# Patient Record
Sex: MALE | Race: AMERICAN INDIAN OR ALASKA NATIVE | NOT HISPANIC OR LATINO | Employment: OTHER | URBAN - METROPOLITAN AREA
[De-identification: names, ages, dates, MRNs, and addresses within clinical notes are randomized per-mention and may not be internally consistent; named-entity substitution may affect disease eponyms.]

---

## 2023-03-03 PROBLEM — E11.9 TYPE 2 DIABETES MELLITUS (MULTI): Status: ACTIVE | Noted: 2019-10-09

## 2023-03-03 PROBLEM — J44.9 MODERATE CHRONIC OBSTRUCTIVE PULMONARY DISEASE (MULTI): Status: ACTIVE | Noted: 2019-03-13

## 2023-03-03 RX ORDER — ASPIRIN 81 MG/1
TABLET ORAL
COMMUNITY

## 2023-03-03 RX ORDER — LEVOTHYROXINE SODIUM 112 UG/1
112 TABLET ORAL DAILY
COMMUNITY
Start: 2021-09-17 | End: 2023-03-29 | Stop reason: SDUPTHER

## 2023-03-03 RX ORDER — ATORVASTATIN CALCIUM 40 MG/1
40 TABLET, FILM COATED ORAL NIGHTLY
COMMUNITY
Start: 2014-01-17 | End: 2023-03-29 | Stop reason: SDUPTHER

## 2023-03-03 RX ORDER — METOPROLOL TARTRATE 50 MG/1
50 TABLET ORAL 2 TIMES DAILY
COMMUNITY
Start: 2012-10-05 | End: 2023-03-29 | Stop reason: SDUPTHER

## 2023-03-21 LAB
ALANINE AMINOTRANSFERASE (SGPT) (U/L) IN SER/PLAS: 15 U/L (ref 10–52)
ALBUMIN (G/DL) IN SER/PLAS: 3.9 G/DL (ref 3.4–5)
ALKALINE PHOSPHATASE (U/L) IN SER/PLAS: 95 U/L (ref 33–136)
ANION GAP IN SER/PLAS: 11 MMOL/L (ref 10–20)
ASPARTATE AMINOTRANSFERASE (SGOT) (U/L) IN SER/PLAS: 14 U/L (ref 9–39)
BILIRUBIN TOTAL (MG/DL) IN SER/PLAS: 0.7 MG/DL (ref 0–1.2)
CALCIUM (MG/DL) IN SER/PLAS: 8.9 MG/DL (ref 8.6–10.3)
CARBON DIOXIDE, TOTAL (MMOL/L) IN SER/PLAS: 29 MMOL/L (ref 21–32)
CHLORIDE (MMOL/L) IN SER/PLAS: 100 MMOL/L (ref 98–107)
CREATININE (MG/DL) IN SER/PLAS: 0.93 MG/DL (ref 0.5–1.3)
ESTIMATED AVERAGE GLUCOSE FOR HBA1C: 140 MG/DL
GFR MALE: 88 ML/MIN/1.73M2
GLUCOSE (MG/DL) IN SER/PLAS: 92 MG/DL (ref 74–99)
HEMOGLOBIN A1C/HEMOGLOBIN TOTAL IN BLOOD: 6.5 %
POTASSIUM (MMOL/L) IN SER/PLAS: 3.9 MMOL/L (ref 3.5–5.3)
PROSTATE SPECIFIC AG (NG/ML) IN SER/PLAS: 0.47 NG/ML (ref 0–4)
PROTEIN TOTAL: 6.9 G/DL (ref 6.4–8.2)
SODIUM (MMOL/L) IN SER/PLAS: 136 MMOL/L (ref 136–145)
THYROTROPIN (MIU/L) IN SER/PLAS BY DETECTION LIMIT <= 0.05 MIU/L: 4.55 MIU/L (ref 0.44–3.98)
THYROXINE (T4) FREE (NG/DL) IN SER/PLAS: 1.09 NG/DL (ref 0.61–1.12)
UREA NITROGEN (MG/DL) IN SER/PLAS: 14 MG/DL (ref 6–23)

## 2023-03-29 ENCOUNTER — OFFICE VISIT (OUTPATIENT)
Dept: PRIMARY CARE | Facility: CLINIC | Age: 73
End: 2023-03-29
Payer: MEDICARE

## 2023-03-29 VITALS
SYSTOLIC BLOOD PRESSURE: 120 MMHG | OXYGEN SATURATION: 98 % | HEIGHT: 70 IN | WEIGHT: 217 LBS | BODY MASS INDEX: 31.07 KG/M2 | HEART RATE: 50 BPM | DIASTOLIC BLOOD PRESSURE: 74 MMHG | TEMPERATURE: 97 F

## 2023-03-29 DIAGNOSIS — Z00.00 ROUTINE GENERAL MEDICAL EXAMINATION AT HEALTH CARE FACILITY: Primary | ICD-10-CM

## 2023-03-29 DIAGNOSIS — J44.9 MODERATE CHRONIC OBSTRUCTIVE PULMONARY DISEASE (MULTI): ICD-10-CM

## 2023-03-29 DIAGNOSIS — E78.2 MIXED HYPERLIPIDEMIA: ICD-10-CM

## 2023-03-29 DIAGNOSIS — E11.9 TYPE 2 DIABETES MELLITUS WITHOUT COMPLICATION, WITHOUT LONG-TERM CURRENT USE OF INSULIN (MULTI): ICD-10-CM

## 2023-03-29 DIAGNOSIS — E03.9 HYPOTHYROIDISM, UNSPECIFIED TYPE: ICD-10-CM

## 2023-03-29 DIAGNOSIS — I25.10 CORONARY ARTERIOSCLEROSIS: ICD-10-CM

## 2023-03-29 DIAGNOSIS — I10 ESSENTIAL HYPERTENSION: ICD-10-CM

## 2023-03-29 PROCEDURE — 3078F DIAST BP <80 MM HG: CPT | Performed by: FAMILY MEDICINE

## 2023-03-29 PROCEDURE — G0439 PPPS, SUBSEQ VISIT: HCPCS | Performed by: FAMILY MEDICINE

## 2023-03-29 PROCEDURE — 1170F FXNL STATUS ASSESSED: CPT | Performed by: FAMILY MEDICINE

## 2023-03-29 PROCEDURE — 1036F TOBACCO NON-USER: CPT | Performed by: FAMILY MEDICINE

## 2023-03-29 PROCEDURE — 3044F HG A1C LEVEL LT 7.0%: CPT | Performed by: FAMILY MEDICINE

## 2023-03-29 PROCEDURE — 3074F SYST BP LT 130 MM HG: CPT | Performed by: FAMILY MEDICINE

## 2023-03-29 PROCEDURE — 99214 OFFICE O/P EST MOD 30 MIN: CPT | Performed by: FAMILY MEDICINE

## 2023-03-29 PROCEDURE — 1159F MED LIST DOCD IN RCRD: CPT | Performed by: FAMILY MEDICINE

## 2023-03-29 PROCEDURE — 1160F RVW MEDS BY RX/DR IN RCRD: CPT | Performed by: FAMILY MEDICINE

## 2023-03-29 RX ORDER — LEVOTHYROXINE SODIUM 112 UG/1
112 TABLET ORAL DAILY
Qty: 90 TABLET | Refills: 3 | Status: SHIPPED | OUTPATIENT
Start: 2023-03-29 | End: 2024-04-03 | Stop reason: SDUPTHER

## 2023-03-29 RX ORDER — ATORVASTATIN CALCIUM 40 MG/1
40 TABLET, FILM COATED ORAL NIGHTLY
Qty: 90 TABLET | Refills: 3 | Status: SHIPPED | OUTPATIENT
Start: 2023-03-29 | End: 2023-10-04 | Stop reason: SDUPTHER

## 2023-03-29 RX ORDER — METOPROLOL TARTRATE 50 MG/1
50 TABLET ORAL 2 TIMES DAILY
Qty: 180 TABLET | Refills: 3 | Status: SHIPPED | OUTPATIENT
Start: 2023-03-29 | End: 2023-10-04 | Stop reason: SDUPTHER

## 2023-03-29 ASSESSMENT — PATIENT HEALTH QUESTIONNAIRE - PHQ9
1. LITTLE INTEREST OR PLEASURE IN DOING THINGS: NOT AT ALL
2. FEELING DOWN, DEPRESSED OR HOPELESS: NOT AT ALL
SUM OF ALL RESPONSES TO PHQ9 QUESTIONS 1 AND 2: 0

## 2023-03-29 ASSESSMENT — ACTIVITIES OF DAILY LIVING (ADL)
GROCERY_SHOPPING: INDEPENDENT
DOING_HOUSEWORK: INDEPENDENT
TAKING_MEDICATION: INDEPENDENT
MANAGING_FINANCES: INDEPENDENT
DRESSING: INDEPENDENT
BATHING: INDEPENDENT

## 2023-03-29 NOTE — PROGRESS NOTES
"Subjective   Reason for Visit: Heri Beasley is an 72 y.o. male here for a Medicare Wellness visit.     Past Medical, Surgical, and Family History reviewed and updated in chart.    Reviewed all medications by prescribing practitioner or clinical pharmacist (such as prescriptions, OTCs, herbal therapies and supplements) and documented in the medical record.    HPI: Pt lost his wife this winter to metastatic breast cancer    1. COPD: stable    2. DM2: a1c was 6.5.  Hasn't been as active.    3. hypothyroid: stable    Preparing meals - independent  Using telephone - independent  Bladder - continent  Bowel - continent    Recent hospital stays -     ADLs - able to dress, walk and bath independently  Instrumental ADL's - able to shop, maintain finances, managing medications, housekeeping independently    Depression: PHQ-2 (screening 2 mins) - negative    Opioid use -   none  Exercise -  tries to stay active  Diet - well balanced  Pain score -    Education - educated pt on healthy eating, exercise, weight loss    Falls -no falls      MiniCO/5      Counseled pt on living will - pt has one    AAA screening: negative in      Prostate CA screen: declines    CV screening: checked and good    Colonoscopy: due for     Diabetes screening:  checked this time, a1c was 6.4.    Glaucoma screen:  encouraged to see optho    Vaccines:  Had the J&J vaccine.  didn't get flu or PNA shot.  Had PNA shot once before.    Patient Care Team:  Fred Ramon MD as PCP - General  Fred Ramon MD as PCP - [employer] PCP             Review of Systems   All other systems reviewed and are negative.      Objective   Vitals:  /74   Pulse 50   Temp 36.1 °C (97 °F)   Ht 1.778 m (5' 10\")   Wt 98.4 kg (217 lb)   SpO2 98%   BMI 31.14 kg/m²       Physical Exam  Vitals reviewed.   Constitutional:       General: He is not in acute distress.     Appearance: Normal appearance. He is well-developed. He is not " diaphoretic.   HENT:      Head: Normocephalic and atraumatic.      Right Ear: Tympanic membrane normal.      Left Ear: Tympanic membrane normal.      Nose: Nose normal.      Mouth/Throat:      Mouth: Mucous membranes are moist.   Eyes:      Pupils: Pupils are equal, round, and reactive to light.   Cardiovascular:      Rate and Rhythm: Normal rate and regular rhythm.      Heart sounds: Normal heart sounds. No murmur heard.     No friction rub. No gallop.   Pulmonary:      Effort: Pulmonary effort is normal.      Breath sounds: Normal breath sounds. No rales.   Abdominal:      General: Bowel sounds are normal.      Palpations: Abdomen is soft.      Tenderness: There is no abdominal tenderness.   Musculoskeletal:      Cervical back: Normal range of motion and neck supple.   Skin:     General: Skin is warm and dry.   Neurological:      Mental Status: He is alert.   Psychiatric:         Mood and Affect: Mood normal.         Assessment/Plan   Problem List Items Addressed This Visit          Respiratory    Moderate chronic obstructive pulmonary disease (CMS/HCC)    Overview     Note: JW            Circulatory    Coronary arteriosclerosis    Overview     Note: fe         Essential hypertension    Overview     Note: bw            Endocrine/Metabolic    Hypothyroidism    Overview     Note: aa         Type 2 diabetes mellitus (CMS/HCC)       Other    Mixed hyperlipidemia    Overview     Note: fe          Other Visit Diagnoses       Routine general medical examination at health care facility    -  Primary          Comforted pt after his wife's passing.  Refilled pts meds.  Fu in 6 mo.

## 2023-09-27 ENCOUNTER — LAB (OUTPATIENT)
Dept: LAB | Facility: LAB | Age: 73
End: 2023-09-27
Payer: MEDICARE

## 2023-09-27 DIAGNOSIS — E11.9 TYPE 2 DIABETES MELLITUS WITHOUT COMPLICATION, WITHOUT LONG-TERM CURRENT USE OF INSULIN (MULTI): ICD-10-CM

## 2023-09-27 LAB
ALANINE AMINOTRANSFERASE (SGPT) (U/L) IN SER/PLAS: 19 U/L (ref 10–52)
ALBUMIN (G/DL) IN SER/PLAS: 4 G/DL (ref 3.4–5)
ALKALINE PHOSPHATASE (U/L) IN SER/PLAS: 79 U/L (ref 33–136)
ANION GAP IN SER/PLAS: 10 MMOL/L (ref 10–20)
ASPARTATE AMINOTRANSFERASE (SGOT) (U/L) IN SER/PLAS: 15 U/L (ref 9–39)
BILIRUBIN TOTAL (MG/DL) IN SER/PLAS: 1 MG/DL (ref 0–1.2)
CALCIUM (MG/DL) IN SER/PLAS: 8.9 MG/DL (ref 8.6–10.3)
CARBON DIOXIDE, TOTAL (MMOL/L) IN SER/PLAS: 28 MMOL/L (ref 21–32)
CHLORIDE (MMOL/L) IN SER/PLAS: 103 MMOL/L (ref 98–107)
CHOLESTEROL (MG/DL) IN SER/PLAS: 98 MG/DL (ref 0–199)
CHOLESTEROL IN HDL (MG/DL) IN SER/PLAS: 36.8 MG/DL
CHOLESTEROL/HDL RATIO: 2.7
CREATININE (MG/DL) IN SER/PLAS: 0.89 MG/DL (ref 0.5–1.3)
ESTIMATED AVERAGE GLUCOSE FOR HBA1C: 151 MG/DL
GFR MALE: >90 ML/MIN/1.73M2
GLUCOSE (MG/DL) IN SER/PLAS: 90 MG/DL (ref 74–99)
HEMOGLOBIN A1C/HEMOGLOBIN TOTAL IN BLOOD: 6.9 %
LDL: 48 MG/DL (ref 0–99)
POTASSIUM (MMOL/L) IN SER/PLAS: 4.3 MMOL/L (ref 3.5–5.3)
PROTEIN TOTAL: 6.4 G/DL (ref 6.4–8.2)
SODIUM (MMOL/L) IN SER/PLAS: 137 MMOL/L (ref 136–145)
TRIGLYCERIDE (MG/DL) IN SER/PLAS: 67 MG/DL (ref 0–149)
UREA NITROGEN (MG/DL) IN SER/PLAS: 17 MG/DL (ref 6–23)
VLDL: 13 MG/DL (ref 0–40)

## 2023-09-27 PROCEDURE — 80053 COMPREHEN METABOLIC PANEL: CPT

## 2023-09-27 PROCEDURE — 83036 HEMOGLOBIN GLYCOSYLATED A1C: CPT

## 2023-09-27 PROCEDURE — 80061 LIPID PANEL: CPT

## 2023-09-27 PROCEDURE — 36415 COLL VENOUS BLD VENIPUNCTURE: CPT

## 2023-10-04 ENCOUNTER — OFFICE VISIT (OUTPATIENT)
Dept: PRIMARY CARE | Facility: CLINIC | Age: 73
End: 2023-10-04
Payer: MEDICARE

## 2023-10-04 VITALS
BODY MASS INDEX: 31.28 KG/M2 | DIASTOLIC BLOOD PRESSURE: 72 MMHG | HEART RATE: 54 BPM | SYSTOLIC BLOOD PRESSURE: 116 MMHG | WEIGHT: 218 LBS | TEMPERATURE: 97.1 F

## 2023-10-04 DIAGNOSIS — E11.9 TYPE 2 DIABETES MELLITUS WITHOUT COMPLICATION, WITHOUT LONG-TERM CURRENT USE OF INSULIN (MULTI): ICD-10-CM

## 2023-10-04 DIAGNOSIS — Z12.5 SCREENING FOR PROSTATE CANCER: Primary | ICD-10-CM

## 2023-10-04 DIAGNOSIS — I10 ESSENTIAL HYPERTENSION: ICD-10-CM

## 2023-10-04 DIAGNOSIS — I25.10 CORONARY ARTERIOSCLEROSIS: ICD-10-CM

## 2023-10-04 PROCEDURE — 1159F MED LIST DOCD IN RCRD: CPT | Performed by: FAMILY MEDICINE

## 2023-10-04 PROCEDURE — 3074F SYST BP LT 130 MM HG: CPT | Performed by: FAMILY MEDICINE

## 2023-10-04 PROCEDURE — 3044F HG A1C LEVEL LT 7.0%: CPT | Performed by: FAMILY MEDICINE

## 2023-10-04 PROCEDURE — 3078F DIAST BP <80 MM HG: CPT | Performed by: FAMILY MEDICINE

## 2023-10-04 PROCEDURE — 1036F TOBACCO NON-USER: CPT | Performed by: FAMILY MEDICINE

## 2023-10-04 PROCEDURE — 1160F RVW MEDS BY RX/DR IN RCRD: CPT | Performed by: FAMILY MEDICINE

## 2023-10-04 PROCEDURE — 99214 OFFICE O/P EST MOD 30 MIN: CPT | Performed by: FAMILY MEDICINE

## 2023-10-04 RX ORDER — ATORVASTATIN CALCIUM 40 MG/1
40 TABLET, FILM COATED ORAL NIGHTLY
Qty: 90 EACH | Refills: 3 | Status: SHIPPED | OUTPATIENT
Start: 2023-10-04 | End: 2024-10-03

## 2023-10-04 RX ORDER — METOPROLOL TARTRATE 50 MG/1
50 TABLET ORAL 2 TIMES DAILY
Qty: 180 EACH | Refills: 3 | Status: SHIPPED | OUTPATIENT
Start: 2023-10-04 | End: 2024-10-03

## 2023-10-04 ASSESSMENT — ENCOUNTER SYMPTOMS: HYPERTENSION: 1

## 2023-10-04 NOTE — PROGRESS NOTES
Subjective   Patient ID: Heri Beasley is a 72 y.o. male who presents for Diabetes, Hypertension, Hyperlipidemia, and Hypothyroidism (Review BW).  Diabetes    Hypertension    Hyperlipidemia      1. COPD: stable    2. DM2: a1c was 6.9.  Hasn't been as active.    3. hypothyroid: stable    Patient Active Problem List   Diagnosis    Coronary arteriosclerosis    Essential hypertension    Mixed hyperlipidemia    Hypothyroidism    Moderate chronic obstructive pulmonary disease (CMS/HCC)    Type 2 diabetes mellitus (CMS/HCC)       Social Connections: Not on file       Current Outpatient Medications on File Prior to Visit   Medication Sig Dispense Refill    aspirin 81 mg EC tablet       atorvastatin (Lipitor) 40 mg tablet Take 1 tablet (40 mg) by mouth once daily at bedtime. 90 tablet 3    levothyroxine (Synthroid, Levoxyl) 112 mcg tablet Take 1 tablet (112 mcg) by mouth once daily. 90 tablet 3    metoprolol tartrate (Lopressor) 50 mg tablet Take 1 tablet (50 mg) by mouth in the morning and 1 tablet (50 mg) before bedtime. 180 tablet 3     No current facility-administered medications on file prior to visit.        Vitals:    10/04/23 0913   BP: 116/72   Pulse: 54   Temp: 36.2 °C (97.1 °F)     Vitals:    10/04/23 0913   Weight: 98.9 kg (218 lb)       Review of Systems   All other systems reviewed and are negative.      Objective     Physical Exam  Constitutional:       Appearance: Normal appearance. He is well-developed.   HENT:      Head: Atraumatic.   Cardiovascular:      Rate and Rhythm: Normal rate and regular rhythm.      Heart sounds: Normal heart sounds. No murmur heard.  Pulmonary:      Effort: Pulmonary effort is normal.      Breath sounds: Normal breath sounds.   Abdominal:      General: Bowel sounds are normal.      Palpations: Abdomen is soft.   Skin:     General: Skin is warm.   Neurological:      General: No focal deficit present.      Mental Status: He is alert.   Psychiatric:         Mood and Affect:  Mood normal.         Lab on 09/27/2023   Component Date Value Ref Range Status    Glucose 09/27/2023 90  74 - 99 mg/dL Final    Sodium 09/27/2023 137  136 - 145 mmol/L Final    Potassium 09/27/2023 4.3  3.5 - 5.3 mmol/L Final    Chloride 09/27/2023 103  98 - 107 mmol/L Final    Bicarbonate 09/27/2023 28  21 - 32 mmol/L Final    Anion Gap 09/27/2023 10  10 - 20 mmol/L Final    Urea Nitrogen 09/27/2023 17  6 - 23 mg/dL Final    Creatinine 09/27/2023 0.89  0.50 - 1.30 mg/dL Final    GFR MALE 09/27/2023 >90  >90 mL/min/1.73m2 Final     CALCULATIONS OF ESTIMATED GFR ARE PERFORMED   USING THE 2021 CKD-EPI STUDY REFIT EQUATION   WITHOUT THE RACE VARIABLE FOR THE IDMS-TRACEABLE   CREATININE METHODS.    https://jasn.asnjournals.org/content/early/2021/09/22/ASN.5415966363    Calcium 09/27/2023 8.9  8.6 - 10.3 mg/dL Final    Albumin 09/27/2023 4.0  3.4 - 5.0 g/dL Final    Alkaline Phosphatase 09/27/2023 79  33 - 136 U/L Final    Total Protein 09/27/2023 6.4  6.4 - 8.2 g/dL Final    AST 09/27/2023 15  9 - 39 U/L Final    Total Bilirubin 09/27/2023 1.0  0.0 - 1.2 mg/dL Final    ALT (SGPT) 09/27/2023 19  10 - 52 U/L Final     Patients treated with Sulfasalazine may generate    falsely decreased results for ALT.    Cholesterol 09/27/2023 98  0 - 199 mg/dL Final    .      AGE      DESIRABLE   BORDERLINE HIGH   HIGH     0-19 Y     0 - 169       170 - 199     >/= 200    20-24 Y     0 - 189       190 - 224     >/= 225         >24 Y     0 - 199       200 - 239     >/= 240   **All ranges are based on fasting samples. Specific   therapeutic targets will vary based on patient-specific   cardiac risk.  .   Pediatric guidelines reference:Pediatrics 2011, 128(S5).   Adult guidelines reference: NCEP ATPIII Guidelines,     JANNIE 2001, 258:2486-97  .   Venipuncture immediately after or during the    administration of Metamizole may lead to falsely   low results. Testing should be performed immediately   prior to Metamizole dosing.    HDL  09/27/2023 36.8 (A)  mg/dL Final    .      AGE      VERY LOW   LOW     NORMAL    HIGH       0-19 Y       < 35   < 40     40-45     ----    20-24 Y       ----   < 40       >45     ----      >24 Y       ----   < 40     40-60      >60  .    Cholesterol/HDL Ratio 09/27/2023 2.7   Final    REF VALUES  DESIRABLE  < 3.4  HIGH RISK  > 5.0    LDL 09/27/2023 48  0 - 99 mg/dL Final    .                           NEAR      BORD      AGE      DESIRABLE  OPTIMAL    HIGH     HIGH     VERY HIGH     0-19 Y     0 - 109     ---    110-129   >/= 130     ----    20-24 Y     0 - 119     ---    120-159   >/= 160     ----      >24 Y     0 -  99   100-129  130-159   160-189     >/=190  .    VLDL 09/27/2023 13  0 - 40 mg/dL Final    Triglycerides 09/27/2023 67  0 - 149 mg/dL Final    .      AGE      DESIRABLE   BORDERLINE HIGH   HIGH     VERY HIGH   0 D-90 D    19 - 174         ----         ----        ----  91 D- 9 Y     0 -  74        75 -  99     >/= 100      ----    10-19 Y     0 -  89        90 - 129     >/= 130      ----    20-24 Y     0 - 114       115 - 149     >/= 150      ----         >24 Y     0 - 149       150 - 199    200- 499    >/= 500  .   Venipuncture immediately after or during the    administration of Metamizole may lead to falsely   low results. Testing should be performed immediately   prior to Metamizole dosing.    Hemoglobin A1C 09/27/2023 6.9 (A)  % Final         Diagnosis of Diabetes-Adults   Non-Diabetic: < or = 5.6%   Increased risk for developing diabetes: 5.7-6.4%   Diagnostic of diabetes: > or = 6.5%  .       Monitoring of Diabetes                Age (y)     Therapeutic Goal (%)   Adults:          >18           <7.0   Pediatrics:    13-18           <7.5                   7-12           <8.0                   0- 6            7.5-8.5   American Diabetes Association. Diabetes Care 33(S1), Jan 2010.    Estimated Average Glucose 09/27/2023 151  MG/DL Final       Assessment/Plan   Problem List Items Addressed This  Visit             ICD-10-CM    Coronary arteriosclerosis I25.10    Relevant Medications    atorvastatin (Lipitor) 40 mg tablet    metoprolol tartrate (Lopressor) 50 mg tablet    Other Relevant Orders    Comprehensive Metabolic Panel    TSH with reflex to Free T4 if abnormal    Lipid Panel    Essential hypertension I10    Relevant Medications    metoprolol tartrate (Lopressor) 50 mg tablet    Type 2 diabetes mellitus (CMS/HCC) E11.9    Relevant Orders    Hemoglobin A1C    Comprehensive Metabolic Panel    TSH with reflex to Free T4 if abnormal    Lipid Panel     Other Visit Diagnoses         Codes    Screening for prostate cancer    -  Primary Z12.5    Relevant Orders    Prostate Specific Antigen          Patient is doing ok.  Advised weight loss.  Refilled pts meds.  Follow up in 6 mo.

## 2024-03-25 ENCOUNTER — LAB (OUTPATIENT)
Dept: LAB | Facility: LAB | Age: 74
End: 2024-03-25
Payer: MEDICARE

## 2024-03-25 DIAGNOSIS — I25.10 CORONARY ARTERIOSCLEROSIS: ICD-10-CM

## 2024-03-25 DIAGNOSIS — Z12.5 SCREENING FOR PROSTATE CANCER: ICD-10-CM

## 2024-03-25 DIAGNOSIS — E11.9 TYPE 2 DIABETES MELLITUS WITHOUT COMPLICATION, WITHOUT LONG-TERM CURRENT USE OF INSULIN (MULTI): ICD-10-CM

## 2024-03-25 LAB
ALBUMIN SERPL BCP-MCNC: 3.9 G/DL (ref 3.4–5)
ALP SERPL-CCNC: 82 U/L (ref 33–136)
ALT SERPL W P-5'-P-CCNC: 16 U/L (ref 10–52)
ANION GAP SERPL CALC-SCNC: 8 MMOL/L (ref 10–20)
AST SERPL W P-5'-P-CCNC: 12 U/L (ref 9–39)
BILIRUB SERPL-MCNC: 0.7 MG/DL (ref 0–1.2)
BUN SERPL-MCNC: 15 MG/DL (ref 6–23)
CALCIUM SERPL-MCNC: 8.8 MG/DL (ref 8.6–10.3)
CHLORIDE SERPL-SCNC: 102 MMOL/L (ref 98–107)
CHOLEST SERPL-MCNC: 84 MG/DL (ref 0–199)
CHOLESTEROL/HDL RATIO: 2.3
CO2 SERPL-SCNC: 30 MMOL/L (ref 21–32)
CREAT SERPL-MCNC: 0.86 MG/DL (ref 0.5–1.3)
EGFRCR SERPLBLD CKD-EPI 2021: >90 ML/MIN/1.73M*2
EST. AVERAGE GLUCOSE BLD GHB EST-MCNC: 146 MG/DL
GLUCOSE SERPL-MCNC: 108 MG/DL (ref 74–99)
HBA1C MFR BLD: 6.7 %
HDLC SERPL-MCNC: 36.9 MG/DL
LDLC SERPL CALC-MCNC: 34 MG/DL
NON HDL CHOLESTEROL: 47 MG/DL (ref 0–149)
POTASSIUM SERPL-SCNC: 4.1 MMOL/L (ref 3.5–5.3)
PROT SERPL-MCNC: 6.5 G/DL (ref 6.4–8.2)
PSA SERPL-MCNC: 0.7 NG/ML
SODIUM SERPL-SCNC: 136 MMOL/L (ref 136–145)
T4 FREE SERPL-MCNC: 1 NG/DL (ref 0.61–1.12)
TRIGL SERPL-MCNC: 67 MG/DL (ref 0–149)
TSH SERPL-ACNC: 5.52 MIU/L (ref 0.44–3.98)
VLDL: 13 MG/DL (ref 0–40)

## 2024-03-25 PROCEDURE — 84439 ASSAY OF FREE THYROXINE: CPT

## 2024-03-25 PROCEDURE — 84443 ASSAY THYROID STIM HORMONE: CPT

## 2024-03-25 PROCEDURE — 36415 COLL VENOUS BLD VENIPUNCTURE: CPT

## 2024-03-25 PROCEDURE — 80053 COMPREHEN METABOLIC PANEL: CPT

## 2024-03-25 PROCEDURE — G0103 PSA SCREENING: HCPCS

## 2024-03-25 PROCEDURE — 80061 LIPID PANEL: CPT

## 2024-03-25 PROCEDURE — 83036 HEMOGLOBIN GLYCOSYLATED A1C: CPT

## 2024-04-03 ENCOUNTER — OFFICE VISIT (OUTPATIENT)
Dept: PRIMARY CARE | Facility: CLINIC | Age: 74
End: 2024-04-03
Payer: MEDICARE

## 2024-04-03 VITALS
BODY MASS INDEX: 30.92 KG/M2 | HEART RATE: 56 BPM | DIASTOLIC BLOOD PRESSURE: 74 MMHG | WEIGHT: 216 LBS | OXYGEN SATURATION: 97 % | HEIGHT: 70 IN | SYSTOLIC BLOOD PRESSURE: 112 MMHG | TEMPERATURE: 97 F

## 2024-04-03 DIAGNOSIS — Z12.11 SCREENING FOR MALIGNANT NEOPLASM OF COLON: ICD-10-CM

## 2024-04-03 DIAGNOSIS — Z00.00 ROUTINE ADULT HEALTH MAINTENANCE: ICD-10-CM

## 2024-04-03 DIAGNOSIS — E11.9 TYPE 2 DIABETES MELLITUS WITHOUT COMPLICATION, WITHOUT LONG-TERM CURRENT USE OF INSULIN (MULTI): ICD-10-CM

## 2024-04-03 DIAGNOSIS — E03.9 HYPOTHYROIDISM, UNSPECIFIED TYPE: ICD-10-CM

## 2024-04-03 DIAGNOSIS — Z00.00 MEDICARE ANNUAL WELLNESS VISIT, SUBSEQUENT: ICD-10-CM

## 2024-04-03 DIAGNOSIS — Z78.9 FULL CODE STATUS: Primary | ICD-10-CM

## 2024-04-03 PROCEDURE — 3044F HG A1C LEVEL LT 7.0%: CPT | Performed by: FAMILY MEDICINE

## 2024-04-03 PROCEDURE — 99397 PER PM REEVAL EST PAT 65+ YR: CPT | Performed by: FAMILY MEDICINE

## 2024-04-03 PROCEDURE — G0439 PPPS, SUBSEQ VISIT: HCPCS | Performed by: FAMILY MEDICINE

## 2024-04-03 PROCEDURE — 3078F DIAST BP <80 MM HG: CPT | Performed by: FAMILY MEDICINE

## 2024-04-03 PROCEDURE — 1036F TOBACCO NON-USER: CPT | Performed by: FAMILY MEDICINE

## 2024-04-03 PROCEDURE — 1123F ACP DISCUSS/DSCN MKR DOCD: CPT | Performed by: FAMILY MEDICINE

## 2024-04-03 PROCEDURE — 1158F ADVNC CARE PLAN TLK DOCD: CPT | Performed by: FAMILY MEDICINE

## 2024-04-03 PROCEDURE — 3074F SYST BP LT 130 MM HG: CPT | Performed by: FAMILY MEDICINE

## 2024-04-03 PROCEDURE — 1159F MED LIST DOCD IN RCRD: CPT | Performed by: FAMILY MEDICINE

## 2024-04-03 PROCEDURE — 99213 OFFICE O/P EST LOW 20 MIN: CPT | Performed by: FAMILY MEDICINE

## 2024-04-03 PROCEDURE — 1170F FXNL STATUS ASSESSED: CPT | Performed by: FAMILY MEDICINE

## 2024-04-03 PROCEDURE — 3048F LDL-C <100 MG/DL: CPT | Performed by: FAMILY MEDICINE

## 2024-04-03 RX ORDER — LEVOTHYROXINE SODIUM 112 UG/1
112 TABLET ORAL DAILY
Qty: 90 TABLET | Refills: 3 | Status: SHIPPED | OUTPATIENT
Start: 2024-04-03 | End: 2025-04-03

## 2024-04-03 ASSESSMENT — PATIENT HEALTH QUESTIONNAIRE - PHQ9
2. FEELING DOWN, DEPRESSED OR HOPELESS: NOT AT ALL
1. LITTLE INTEREST OR PLEASURE IN DOING THINGS: NOT AT ALL
SUM OF ALL RESPONSES TO PHQ9 QUESTIONS 1 AND 2: 0

## 2024-04-03 ASSESSMENT — ACTIVITIES OF DAILY LIVING (ADL)
DOING_HOUSEWORK: INDEPENDENT
MANAGING_FINANCES: INDEPENDENT
GROCERY_SHOPPING: INDEPENDENT
TAKING_MEDICATION: INDEPENDENT
DRESSING: INDEPENDENT
BATHING: INDEPENDENT

## 2024-04-03 NOTE — PROGRESS NOTES
"Subjective   Reason for Visit: Heri Beasley is an 73 y.o. male here for a Medicare Wellness visit.     Past Medical, Surgical, and Family History reviewed and updated in chart.    Reviewed all medications by prescribing practitioner or clinical pharmacist (such as prescriptions, OTCs, herbal therapies and supplements) and documented in the medical record.    HPI: Pt lost his wife this winter to metastatic breast cancer.  He recently went on a date with another woman.    1. COPD: stable    2. DM2: a1c was 6.7.      3. hypothyroid: stable    Preparing meals - independent  Using telephone - independent  Bladder - continent  Bowel - continent    Recent hospital stays - none    ADLs - able to dress, walk and bath independently  Instrumental ADL's - able to shop, maintain finances, managing medications, housekeeping independently    Depression: PHQ-2 (screening 2 mins) - negative    Opioid use -   none  Exercise -  tries to stay active  Diet - well balanced  Pain score -    Education - educated pt on healthy eating, exercise, weight loss    Falls -no falls      MiniCO/5      Counseled pt on living will - pt has one    AAA screening: negative in      Prostate CA screen: declines    CV screening: checked and good    Colonoscopy: ordered    Diabetes screening:  checked this time, a1c was 6.7.    Glaucoma screen:  encouraged to see optho    Vaccines:  Had the J&J vaccine.  didn't get flu or PNA shot.  Had PNA shot once before.    Patient Care Team:  Fred Ramon MD as PCP - General  Fred Ramon MD as PCP - [employer] PCP             Review of Systems   All other systems reviewed and are negative.      Objective   Vitals:  /74   Pulse 56   Temp 36.1 °C (97 °F)   Ht 1.778 m (5' 10\")   Wt 98 kg (216 lb)   SpO2 97%   BMI 30.99 kg/m²       Physical Exam  Vitals reviewed.   Constitutional:       General: He is not in acute distress.     Appearance: Normal appearance. He is " well-developed. He is not diaphoretic.   HENT:      Head: Normocephalic and atraumatic.      Right Ear: Tympanic membrane normal.      Left Ear: Tympanic membrane normal.      Nose: Nose normal.      Mouth/Throat:      Mouth: Mucous membranes are moist.   Eyes:      Pupils: Pupils are equal, round, and reactive to light.   Cardiovascular:      Rate and Rhythm: Normal rate and regular rhythm.      Heart sounds: Normal heart sounds. No murmur heard.     No friction rub. No gallop.   Pulmonary:      Effort: Pulmonary effort is normal.      Breath sounds: Normal breath sounds. No rales.   Abdominal:      General: Bowel sounds are normal.      Palpations: Abdomen is soft.      Tenderness: There is no abdominal tenderness.   Musculoskeletal:      Cervical back: Normal range of motion and neck supple.   Skin:     General: Skin is warm and dry.   Neurological:      Mental Status: He is alert.   Psychiatric:         Mood and Affect: Mood normal.         Assessment/Plan   Problem List Items Addressed This Visit       Hypothyroidism    Overview     Note: aa         Relevant Medications    levothyroxine (Synthroid, Levoxyl) 112 mcg tablet     Other Visit Diagnoses       Full code status    -  Primary    Relevant Orders    Full code (Completed)    Screening for malignant neoplasm of colon        Relevant Orders    Colonoscopy Screening; Average Risk Patient    Medicare annual wellness visit, subsequent        Routine adult health maintenance              Patient is doing well.  Refilled pts meds.  Follow up in 6 mo.

## 2024-04-10 ENCOUNTER — TELEPHONE (OUTPATIENT)
Dept: GASTROENTEROLOGY | Facility: CLINIC | Age: 74
End: 2024-04-10
Payer: MEDICARE

## 2024-04-10 NOTE — TELEPHONE ENCOUNTER
----- Message from Fredy Long sent at 4/9/2024  9:11 AM EDT -----  Regarding: FW: OPEN ACCESS-OFFICE VISIT REQUIRED  Left a message for patient to schedule OV    ----- Message -----  From: Shilpa Lainez  Sent: 4/4/2024   7:30 AM EDT  To: [id number]  Subject: OPEN ACCESS-OFFICE VISIT REQUIRED                OPEN ACCESS- OFFICE VISIT REQUIRED. PAPERWORK SCANNED 4-4-24

## 2024-05-17 ENCOUNTER — OFFICE VISIT (OUTPATIENT)
Dept: GASTROENTEROLOGY | Facility: CLINIC | Age: 74
End: 2024-05-17
Payer: MEDICARE

## 2024-05-17 VITALS
OXYGEN SATURATION: 96 % | BODY MASS INDEX: 30.21 KG/M2 | HEART RATE: 52 BPM | SYSTOLIC BLOOD PRESSURE: 124 MMHG | HEIGHT: 70 IN | WEIGHT: 211 LBS | DIASTOLIC BLOOD PRESSURE: 74 MMHG

## 2024-05-17 DIAGNOSIS — Z12.11 SCREENING FOR COLON CANCER: Primary | ICD-10-CM

## 2024-05-17 PROCEDURE — 3044F HG A1C LEVEL LT 7.0%: CPT | Performed by: INTERNAL MEDICINE

## 2024-05-17 PROCEDURE — 1159F MED LIST DOCD IN RCRD: CPT | Performed by: INTERNAL MEDICINE

## 2024-05-17 PROCEDURE — 1160F RVW MEDS BY RX/DR IN RCRD: CPT | Performed by: INTERNAL MEDICINE

## 2024-05-17 PROCEDURE — 99213 OFFICE O/P EST LOW 20 MIN: CPT | Performed by: INTERNAL MEDICINE

## 2024-05-17 PROCEDURE — 1123F ACP DISCUSS/DSCN MKR DOCD: CPT | Performed by: INTERNAL MEDICINE

## 2024-05-17 PROCEDURE — 1036F TOBACCO NON-USER: CPT | Performed by: INTERNAL MEDICINE

## 2024-05-17 PROCEDURE — 3074F SYST BP LT 130 MM HG: CPT | Performed by: INTERNAL MEDICINE

## 2024-05-17 PROCEDURE — 3078F DIAST BP <80 MM HG: CPT | Performed by: INTERNAL MEDICINE

## 2024-05-17 PROCEDURE — 3048F LDL-C <100 MG/DL: CPT | Performed by: INTERNAL MEDICINE

## 2024-05-17 ASSESSMENT — ENCOUNTER SYMPTOMS
FATIGUE: 0
CHILLS: 0
DYSURIA: 0
DIZZINESS: 0
HEADACHES: 0
ADENOPATHY: 0
UNEXPECTED WEIGHT CHANGE: 0
WEAKNESS: 0
MYALGIAS: 0
SHORTNESS OF BREATH: 0
CONFUSION: 0
COUGH: 0
FEVER: 0
ROS GI COMMENTS: AS DETAILED ABOVE.
VOICE CHANGE: 0
WHEEZING: 0
HEMATURIA: 0
NUMBNESS: 0
SEIZURES: 0
SORE THROAT: 0
NERVOUS/ANXIOUS: 0
ARTHRALGIAS: 0
BRUISES/BLEEDS EASILY: 0
TROUBLE SWALLOWING: 0
PALPITATIONS: 0

## 2024-05-17 NOTE — LETTER
May 17, 2024     Fred Ramon MD  [address]  [facility] [address]  [facility] [address]    Patient: Heri Beasley   YOB: 1950   Date of Visit: 5/17/2024       Dear Dr. Fred Ramon MD:    Thank you for referring Heri Beasley to me for evaluation. Below are my notes for this consultation.  If you have questions, please do not hesitate to call me. I look forward to following your patient along with you.       Sincerely,     Bryan Drummond MD      CC: No Recipients  ______________________________________________________________________________________        [facility]    ASSESSMENT and PLAN:       Heri Beasley is a 73 y.o. male with a significant past medical history of CAD, HTN, DM2, COPD, and HLD who presents for consultation requested by his primary care provider (Fred Ramon MD) for a colonoscopy.       Colon Polyps  History of polyps on last colonoscopy and due for surveillance. Asymptomatic at this time.  No previous difficulties with sedation. He is on full dose ASA (325 mg daily) and he will need to decrease the dose of that to 81 mg daily for 10 days prior to his procedure. After his procedure he should be able to resume the full dose. Ultimately would defer to his PCP to determine which dose of ASA is most appropriate for him long term.  - colonoscopy scheduled in endo  - decrease dose of ASA to 81 mg daily for 10 days prior to procedure  - OTC bowel prep discussed with patient  - instructions for procedure discussed with patient in the office today and hard copy given to patient today      Follow up with GI as needed.          Bryan Drummond MD        Gastroenterology    [facility]    Clinical   [facility]        Subjective   HISTORY OF PRESENT ILLNESS:     Chief Complaint  New Patient Visit (colonoscopy)    History Of Present Illness:    Heri PAGE  Gale is a 73 y.o. male with a significant past medical history of CAD, HTN, DM2, COPD, and HLD who presents for consultation requested by his primary care provider (Fred Ramon MD) for a colonoscopy.     His medication list includes ASA (81 mg daily).    He recently saw his PCP and surveillance colonoscopy was ordered.      Today he says that he is doing well. He denies any GI symptoms. He says that he did well with his last colonoscopy and he denies any difficulties with sedation/anesthesia in the past. He says that he is currently taking ASA (325 mg daily). He says that for his last colonoscopy he stopped that completely. No no longer follows with cardiology.      Patient denies any heartburn/GERD, N/V, dysphagia, odynophagia, abdominal pain, diarrhea, constipation, hematemesis, hematochezia, melena, or weight loss.    Endoscopy History:  7/31/2019 - Colonoscopy: three sigmoid polyps (TA and hyperplastic on path), diverticulosis      Review of systems:   Review of Systems   Constitutional:  Negative for chills, fatigue, fever and unexpected weight change.   HENT:  Negative for congestion, sneezing, sore throat, trouble swallowing and voice change.    Respiratory:  Negative for cough, shortness of breath and wheezing.    Cardiovascular:  Negative for chest pain, palpitations and leg swelling.   Gastrointestinal:         As detailed above.   Genitourinary:  Negative for dysuria and hematuria.   Musculoskeletal:  Negative for arthralgias and myalgias.   Skin:  Negative for pallor and rash.   Neurological:  Negative for dizziness, seizures, syncope, weakness, numbness and headaches.   Hematological:  Negative for adenopathy. Does not bruise/bleed easily.   Psychiatric/Behavioral:  Negative for confusion. The patient is not nervous/anxious.    All other systems reviewed and are negative.      I performed a complete 10 point review of systems and it is negative except as noted in HPI or above.      PAST  "HISTORIES:       Past Medical History:  He has a past medical history of Myocardial infarction (Multi) (1999).    Past Surgical History:  He has a past surgical history that includes Cholecystectomy (Feb. 1999).      Social History:  He reports that he quit smoking about 25 years ago. His smoking use included cigarettes. He has never used smokeless tobacco. He reports that he does not currently use alcohol. He reports that he does not use drugs.    Family History:  No known GI disease, specifically denies any family history of pancreatitis, Crohn's, colon cancer, gastroesophageal cancer, or ulcerative colitis.    Family History   Problem Relation Name Age of Onset   • Diabetes Mother     • Prostate cancer Father          Allergies:  Lisinopril      Objective   OBJECTIVE:       Last Recorded Vitals:  Vitals:    05/17/24 1114   BP: 124/74   Pulse: 52   SpO2: 96%   Weight: 95.7 kg (211 lb)   Height: 1.778 m (5' 10\")     /74   Pulse 52   Ht 1.778 m (5' 10\")   Wt 95.7 kg (211 lb)   SpO2 96%   BMI 30.28 kg/m²      Physical Exam:    Physical Exam  Vitals reviewed.   Constitutional:       General: He is not in acute distress.     Appearance: He is not ill-appearing.   HENT:      Head: Normocephalic and atraumatic.   Eyes:      General: No scleral icterus.  Cardiovascular:      Rate and Rhythm: Normal rate and regular rhythm.      Pulses: Normal pulses.      Heart sounds: Normal heart sounds. No murmur heard.  Pulmonary:      Effort: Pulmonary effort is normal. No respiratory distress.      Breath sounds: Normal breath sounds. No wheezing.   Abdominal:      General: Bowel sounds are normal.      Palpations: Abdomen is soft.      Tenderness: There is no abdominal tenderness. There is no rebound.   Musculoskeletal:         General: No swelling or deformity.   Skin:     General: Skin is warm and dry.      Coloration: Skin is not jaundiced.      Findings: No rash.   Neurological:      General: No focal deficit " "present.      Mental Status: He is alert and oriented to person, place, and time.   Psychiatric:         Mood and Affect: Mood normal.         Behavior: Behavior normal.         Thought Content: Thought content normal.         Judgment: Judgment normal.         Home Medications:  Prior to Admission medications    Medication Sig Start Date End Date Taking? Authorizing Provider   aspirin 81 mg EC tablet     Historical Provider, MD   atorvastatin (Lipitor) 40 mg tablet Take 1 tablet (40 mg) by mouth once daily at bedtime. 10/4/23 10/3/24  Fred Ramon MD   levothyroxine (Synthroid, Levoxyl) 112 mcg tablet Take 1 tablet (112 mcg) by mouth once daily. 4/3/24 4/3/25  Fred Ramon MD   metoprolol tartrate (Lopressor) 50 mg tablet Take 1 tablet by mouth 2 times a day. 10/4/23 10/3/24  Fred Ramon MD         Relevant Results Recent labs reviewed in the EMR.    Lab Results   Component Value Date/Time    WBC 11.5 (H) 04/09/2020 2105    HGB 15.7 04/09/2020 2105    HGB 14.5 01/28/2020 0945    MCV 87 04/09/2020 2105     04/09/2020 2105     01/28/2020 0945       Lab Results   Component Value Date/Time     03/25/2024 0752    K 4.1 03/25/2024 0752     03/25/2024 0752    BUN 15 03/25/2024 0752    CREATININE 0.86 03/25/2024 0752    CREATININE 0.89 09/27/2023 0822    CREATININE 0.93 03/21/2023 0801       Lab Results   Component Value Date/Time    BILITOT 0.7 03/25/2024 0752    BILITOT 1.0 09/27/2023 0822    BILITOT 0.7 03/21/2023 0801    BILITOT 0.8 09/14/2022 0930    BILIDIR 0.2 04/10/2020 0305    ALKPHOS 82 03/25/2024 0752    ALKPHOS 79 09/27/2023 0822    ALKPHOS 95 03/21/2023 0801    ALKPHOS 81 09/14/2022 0930    AST 12 03/25/2024 0752    AST 15 09/27/2023 0822    AST 14 03/21/2023 0801    AST 13 09/14/2022 0930    ALT 16 03/25/2024 0752    ALT 19 09/27/2023 0822    ALT 15 03/21/2023 0801    ALT 14 09/14/2022 0930       No results found for: \"CRP\", \"CALPS\"    Radiology: Imaging reviewed in the " EMR.  No results found.    === 04/09/20 ===    CT CHEST PULMONARY EMBOLISM W IV CONTRAST    - Impression -  No evidence of acute pulmonary embolism.    No evidence of thoracic aortic aneurysm or dissection.    No evidence of pneumonia.

## 2024-05-17 NOTE — PATIENT INSTRUCTIONS
You have been scheduled for a colonoscopy.  You were given instructions for preparing for this test in the office today.  If you have questions about these instructions, please call my office at [phone].      Today you stated that you are taking full dose (325 mg) Aspirin every day. You will need to decrease the dose of that to low dose (81 mg) Aspirin for 10 days before your procedure. You will likely be able to increase the dose again the day after your procedure.      After your procedure, you can expect me to talk to you to go over the results of the procedure. If polyps were removed I will usually be able to tell you my initial thoughts about those polyps and give you some idea of when you should have another colonoscopy.    If any polyps are removed during your procedure or if any biopsies are obtained those specimens will go to the pathologists to review under the microscope. Once those results are available they will be sent to me electronically to review. These results will also be available to you at that time through the patient portal. I briefly review all of these results by the next business day to determine if there is any urgent information that needs to be communicated to you right away or anything that would significantly change what I told you at the time of the procedure. If there is nothing urgent this is usually a good sign and I will then do a more extensive review of the result and send you a letter with my final recommendations within a week of the result becoming available. If you have questions or need additional information I urge you to call the office at [phone], but I do ask for patience as I spend a good portion of each day performing procedures like the one you are scheduled for and during those times I am not able to take or return routine phone calls.    You were also given information regarding the schedule for your procedure including the time that you need to  arrive to the endoscopy unit.  You will also be contacted 2-3 day prior to your procedure to confirm the final arrival time.  If you have questions about this or if you need to cancel or change this appointment please call my office at [phone].       Follow up with GI as needed.

## 2024-05-17 NOTE — H&P (VIEW-ONLY)
[facility] Gastroenterology    ASSESSMENT and PLAN:       Heri Beasley is a 73 y.o. male with a significant past medical history of CAD, HTN, DM2, COPD, and HLD who presents for consultation requested by his primary care provider (Fred Ramon MD) for a colonoscopy.       Colon Polyps  History of polyps on last colonoscopy and due for surveillance. Asymptomatic at this time.  No previous difficulties with sedation. He is on full dose ASA (325 mg daily) and he will need to decrease the dose of that to 81 mg daily for 10 days prior to his procedure. After his procedure he should be able to resume the full dose. Ultimately would defer to his PCP to determine which dose of ASA is most appropriate for him long term.  - colonoscopy scheduled in endo  - decrease dose of ASA to 81 mg daily for 10 days prior to procedure  - OTC bowel prep discussed with patient  - instructions for procedure discussed with patient in the office today and hard copy given to patient today      Follow up with GI as needed.          Bryan Drummond MD        Gastroenterology    [facility]    Clinical   [facility]        Subjective   HISTORY OF PRESENT ILLNESS:     Chief Complaint  New Patient Visit (colonoscopy)    History Of Present Illness:    Heri Beasley is a 73 y.o. male with a significant past medical history of CAD, HTN, DM2, COPD, and HLD who presents for consultation requested by his primary care provider (Fred Ramon MD) for a colonoscopy.     His medication list includes ASA (81 mg daily).    He recently saw his PCP and surveillance colonoscopy was ordered.      Today he says that he is doing well. He denies any GI symptoms. He says that he did well with his last colonoscopy and he denies any difficulties with sedation/anesthesia in the past. He says that he is currently taking ASA (325 mg daily). He says that for  his last colonoscopy he stopped that completely. No no longer follows with cardiology.      Patient denies any heartburn/GERD, N/V, dysphagia, odynophagia, abdominal pain, diarrhea, constipation, hematemesis, hematochezia, melena, or weight loss.    Endoscopy History:  7/31/2019 - Colonoscopy: three sigmoid polyps (TA and hyperplastic on path), diverticulosis      Review of systems:   Review of Systems   Constitutional:  Negative for chills, fatigue, fever and unexpected weight change.   HENT:  Negative for congestion, sneezing, sore throat, trouble swallowing and voice change.    Respiratory:  Negative for cough, shortness of breath and wheezing.    Cardiovascular:  Negative for chest pain, palpitations and leg swelling.   Gastrointestinal:         As detailed above.   Genitourinary:  Negative for dysuria and hematuria.   Musculoskeletal:  Negative for arthralgias and myalgias.   Skin:  Negative for pallor and rash.   Neurological:  Negative for dizziness, seizures, syncope, weakness, numbness and headaches.   Hematological:  Negative for adenopathy. Does not bruise/bleed easily.   Psychiatric/Behavioral:  Negative for confusion. The patient is not nervous/anxious.    All other systems reviewed and are negative.      I performed a complete 10 point review of systems and it is negative except as noted in HPI or above.      PAST HISTORIES:       Past Medical History:  He has a past medical history of Myocardial infarction (Multi) (1999).    Past Surgical History:  He has a past surgical history that includes Cholecystectomy (Feb. 1999).      Social History:  He reports that he quit smoking about 25 years ago. His smoking use included cigarettes. He has never used smokeless tobacco. He reports that he does not currently use alcohol. He reports that he does not use drugs.    Family History:  No known GI disease, specifically denies any family history of pancreatitis, Crohn's, colon cancer, gastroesophageal cancer, or  "ulcerative colitis.    Family History   Problem Relation Name Age of Onset    Diabetes Mother      Prostate cancer Father          Allergies:  Lisinopril      Objective   OBJECTIVE:       Last Recorded Vitals:  Vitals:    05/17/24 1114   BP: 124/74   Pulse: 52   SpO2: 96%   Weight: 95.7 kg (211 lb)   Height: 1.778 m (5' 10\")     /74   Pulse 52   Ht 1.778 m (5' 10\")   Wt 95.7 kg (211 lb)   SpO2 96%   BMI 30.28 kg/m²      Physical Exam:    Physical Exam  Vitals reviewed.   Constitutional:       General: He is not in acute distress.     Appearance: He is not ill-appearing.   HENT:      Head: Normocephalic and atraumatic.   Eyes:      General: No scleral icterus.  Cardiovascular:      Rate and Rhythm: Normal rate and regular rhythm.      Pulses: Normal pulses.      Heart sounds: Normal heart sounds. No murmur heard.  Pulmonary:      Effort: Pulmonary effort is normal. No respiratory distress.      Breath sounds: Normal breath sounds. No wheezing.   Abdominal:      General: Bowel sounds are normal.      Palpations: Abdomen is soft.      Tenderness: There is no abdominal tenderness. There is no rebound.   Musculoskeletal:         General: No swelling or deformity.   Skin:     General: Skin is warm and dry.      Coloration: Skin is not jaundiced.      Findings: No rash.   Neurological:      General: No focal deficit present.      Mental Status: He is alert and oriented to person, place, and time.   Psychiatric:         Mood and Affect: Mood normal.         Behavior: Behavior normal.         Thought Content: Thought content normal.         Judgment: Judgment normal.         Home Medications:  Prior to Admission medications    Medication Sig Start Date End Date Taking? Authorizing Provider   aspirin 81 mg EC tablet     Historical Provider, MD   atorvastatin (Lipitor) 40 mg tablet Take 1 tablet (40 mg) by mouth once daily at bedtime. 10/4/23 10/3/24  Fred Ramon MD   levothyroxine (Synthroid, Levoxyl) 112 mcg " "tablet Take 1 tablet (112 mcg) by mouth once daily. 4/3/24 4/3/25  Fred Ramon MD   metoprolol tartrate (Lopressor) 50 mg tablet Take 1 tablet by mouth 2 times a day. 10/4/23 10/3/24  Fred Ramon MD         Relevant Results Recent labs reviewed in the EMR.    Lab Results   Component Value Date/Time    WBC 11.5 (H) 04/09/2020 2105    HGB 15.7 04/09/2020 2105    HGB 14.5 01/28/2020 0945    MCV 87 04/09/2020 2105     04/09/2020 2105     01/28/2020 0945       Lab Results   Component Value Date/Time     03/25/2024 0752    K 4.1 03/25/2024 0752     03/25/2024 0752    BUN 15 03/25/2024 0752    CREATININE 0.86 03/25/2024 0752    CREATININE 0.89 09/27/2023 0822    CREATININE 0.93 03/21/2023 0801       Lab Results   Component Value Date/Time    BILITOT 0.7 03/25/2024 0752    BILITOT 1.0 09/27/2023 0822    BILITOT 0.7 03/21/2023 0801    BILITOT 0.8 09/14/2022 0930    BILIDIR 0.2 04/10/2020 0305    ALKPHOS 82 03/25/2024 0752    ALKPHOS 79 09/27/2023 0822    ALKPHOS 95 03/21/2023 0801    ALKPHOS 81 09/14/2022 0930    AST 12 03/25/2024 0752    AST 15 09/27/2023 0822    AST 14 03/21/2023 0801    AST 13 09/14/2022 0930    ALT 16 03/25/2024 0752    ALT 19 09/27/2023 0822    ALT 15 03/21/2023 0801    ALT 14 09/14/2022 0930       No results found for: \"CRP\", \"CALPS\"    Radiology: Imaging reviewed in the EMR.  No results found.    === 04/09/20 ===    CT CHEST PULMONARY EMBOLISM W IV CONTRAST    - Impression -  No evidence of acute pulmonary embolism.    No evidence of thoracic aortic aneurysm or dissection.    No evidence of pneumonia.      "

## 2024-05-17 NOTE — PROGRESS NOTES
[facility] Gastroenterology    ASSESSMENT and PLAN:       Heri Beasley is a 73 y.o. male with a significant past medical history of CAD, HTN, DM2, COPD, and HLD who presents for consultation requested by his primary care provider (Fred Ramon MD) for a colonoscopy.       Colon Polyps  History of polyps on last colonoscopy and due for surveillance. Asymptomatic at this time.  No previous difficulties with sedation. He is on full dose ASA (325 mg daily) and he will need to decrease the dose of that to 81 mg daily for 10 days prior to his procedure. After his procedure he should be able to resume the full dose. Ultimately would defer to his PCP to determine which dose of ASA is most appropriate for him long term.  - colonoscopy scheduled in endo  - decrease dose of ASA to 81 mg daily for 10 days prior to procedure  - OTC bowel prep discussed with patient  - instructions for procedure discussed with patient in the office today and hard copy given to patient today      Follow up with GI as needed.          Bryan Drummond MD        Gastroenterology    [facility]    Clinical   [facility]        Subjective   HISTORY OF PRESENT ILLNESS:     Chief Complaint  New Patient Visit (colonoscopy)    History Of Present Illness:    Heri Beasley is a 73 y.o. male with a significant past medical history of CAD, HTN, DM2, COPD, and HLD who presents for consultation requested by his primary care provider (Fred Ramon MD) for a colonoscopy.     His medication list includes ASA (81 mg daily).    He recently saw his PCP and surveillance colonoscopy was ordered.      Today he says that he is doing well. He denies any GI symptoms. He says that he did well with his last colonoscopy and he denies any difficulties with sedation/anesthesia in the past. He says that he is currently taking ASA (325 mg daily). He says that for  his last colonoscopy he stopped that completely. No no longer follows with cardiology.      Patient denies any heartburn/GERD, N/V, dysphagia, odynophagia, abdominal pain, diarrhea, constipation, hematemesis, hematochezia, melena, or weight loss.    Endoscopy History:  7/31/2019 - Colonoscopy: three sigmoid polyps (TA and hyperplastic on path), diverticulosis      Review of systems:   Review of Systems   Constitutional:  Negative for chills, fatigue, fever and unexpected weight change.   HENT:  Negative for congestion, sneezing, sore throat, trouble swallowing and voice change.    Respiratory:  Negative for cough, shortness of breath and wheezing.    Cardiovascular:  Negative for chest pain, palpitations and leg swelling.   Gastrointestinal:         As detailed above.   Genitourinary:  Negative for dysuria and hematuria.   Musculoskeletal:  Negative for arthralgias and myalgias.   Skin:  Negative for pallor and rash.   Neurological:  Negative for dizziness, seizures, syncope, weakness, numbness and headaches.   Hematological:  Negative for adenopathy. Does not bruise/bleed easily.   Psychiatric/Behavioral:  Negative for confusion. The patient is not nervous/anxious.    All other systems reviewed and are negative.      I performed a complete 10 point review of systems and it is negative except as noted in HPI or above.      PAST HISTORIES:       Past Medical History:  He has a past medical history of Myocardial infarction (Multi) (1999).    Past Surgical History:  He has a past surgical history that includes Cholecystectomy (Feb. 1999).      Social History:  He reports that he quit smoking about 25 years ago. His smoking use included cigarettes. He has never used smokeless tobacco. He reports that he does not currently use alcohol. He reports that he does not use drugs.    Family History:  No known GI disease, specifically denies any family history of pancreatitis, Crohn's, colon cancer, gastroesophageal cancer, or  "ulcerative colitis.    Family History   Problem Relation Name Age of Onset    Diabetes Mother      Prostate cancer Father          Allergies:  Lisinopril      Objective   OBJECTIVE:       Last Recorded Vitals:  Vitals:    05/17/24 1114   BP: 124/74   Pulse: 52   SpO2: 96%   Weight: 95.7 kg (211 lb)   Height: 1.778 m (5' 10\")     /74   Pulse 52   Ht 1.778 m (5' 10\")   Wt 95.7 kg (211 lb)   SpO2 96%   BMI 30.28 kg/m²      Physical Exam:    Physical Exam  Vitals reviewed.   Constitutional:       General: He is not in acute distress.     Appearance: He is not ill-appearing.   HENT:      Head: Normocephalic and atraumatic.   Eyes:      General: No scleral icterus.  Cardiovascular:      Rate and Rhythm: Normal rate and regular rhythm.      Pulses: Normal pulses.      Heart sounds: Normal heart sounds. No murmur heard.  Pulmonary:      Effort: Pulmonary effort is normal. No respiratory distress.      Breath sounds: Normal breath sounds. No wheezing.   Abdominal:      General: Bowel sounds are normal.      Palpations: Abdomen is soft.      Tenderness: There is no abdominal tenderness. There is no rebound.   Musculoskeletal:         General: No swelling or deformity.   Skin:     General: Skin is warm and dry.      Coloration: Skin is not jaundiced.      Findings: No rash.   Neurological:      General: No focal deficit present.      Mental Status: He is alert and oriented to person, place, and time.   Psychiatric:         Mood and Affect: Mood normal.         Behavior: Behavior normal.         Thought Content: Thought content normal.         Judgment: Judgment normal.         Home Medications:  Prior to Admission medications    Medication Sig Start Date End Date Taking? Authorizing Provider   aspirin 81 mg EC tablet     Historical Provider, MD   atorvastatin (Lipitor) 40 mg tablet Take 1 tablet (40 mg) by mouth once daily at bedtime. 10/4/23 10/3/24  Fred Ramon MD   levothyroxine (Synthroid, Levoxyl) 112 mcg " "tablet Take 1 tablet (112 mcg) by mouth once daily. 4/3/24 4/3/25  Fred Ramon MD   metoprolol tartrate (Lopressor) 50 mg tablet Take 1 tablet by mouth 2 times a day. 10/4/23 10/3/24  Fred Ramon MD         Relevant Results Recent labs reviewed in the EMR.    Lab Results   Component Value Date/Time    WBC 11.5 (H) 04/09/2020 2105    HGB 15.7 04/09/2020 2105    HGB 14.5 01/28/2020 0945    MCV 87 04/09/2020 2105     04/09/2020 2105     01/28/2020 0945       Lab Results   Component Value Date/Time     03/25/2024 0752    K 4.1 03/25/2024 0752     03/25/2024 0752    BUN 15 03/25/2024 0752    CREATININE 0.86 03/25/2024 0752    CREATININE 0.89 09/27/2023 0822    CREATININE 0.93 03/21/2023 0801       Lab Results   Component Value Date/Time    BILITOT 0.7 03/25/2024 0752    BILITOT 1.0 09/27/2023 0822    BILITOT 0.7 03/21/2023 0801    BILITOT 0.8 09/14/2022 0930    BILIDIR 0.2 04/10/2020 0305    ALKPHOS 82 03/25/2024 0752    ALKPHOS 79 09/27/2023 0822    ALKPHOS 95 03/21/2023 0801    ALKPHOS 81 09/14/2022 0930    AST 12 03/25/2024 0752    AST 15 09/27/2023 0822    AST 14 03/21/2023 0801    AST 13 09/14/2022 0930    ALT 16 03/25/2024 0752    ALT 19 09/27/2023 0822    ALT 15 03/21/2023 0801    ALT 14 09/14/2022 0930       No results found for: \"CRP\", \"CALPS\"    Radiology: Imaging reviewed in the EMR.  No results found.    === 04/09/20 ===    CT CHEST PULMONARY EMBOLISM W IV CONTRAST    - Impression -  No evidence of acute pulmonary embolism.    No evidence of thoracic aortic aneurysm or dissection.    No evidence of pneumonia.      "

## 2024-06-11 ENCOUNTER — PREP FOR PROCEDURE (OUTPATIENT)
Dept: GASTROENTEROLOGY | Facility: CLINIC | Age: 74
End: 2024-06-11
Payer: MEDICARE

## 2024-06-11 RX ORDER — SODIUM CHLORIDE 9 MG/ML
20 INJECTION, SOLUTION INTRAVENOUS CONTINUOUS
Status: CANCELLED | OUTPATIENT
Start: 2024-06-11

## 2024-06-12 ENCOUNTER — ANESTHESIA (OUTPATIENT)
Dept: GASTROENTEROLOGY | Facility: HOSPITAL | Age: 74
End: 2024-06-12
Payer: MEDICARE

## 2024-06-12 ENCOUNTER — ANESTHESIA EVENT (OUTPATIENT)
Dept: GASTROENTEROLOGY | Facility: HOSPITAL | Age: 74
End: 2024-06-12
Payer: MEDICARE

## 2024-06-12 ENCOUNTER — HOSPITAL ENCOUNTER (OUTPATIENT)
Dept: GASTROENTEROLOGY | Facility: HOSPITAL | Age: 74
Discharge: HOME | End: 2024-06-12
Payer: MEDICARE

## 2024-06-12 VITALS
SYSTOLIC BLOOD PRESSURE: 119 MMHG | HEIGHT: 70 IN | RESPIRATION RATE: 16 BRPM | HEART RATE: 70 BPM | BODY MASS INDEX: 30.21 KG/M2 | WEIGHT: 211 LBS | OXYGEN SATURATION: 96 % | DIASTOLIC BLOOD PRESSURE: 70 MMHG | TEMPERATURE: 97.1 F

## 2024-06-12 DIAGNOSIS — Z12.11 SCREENING FOR MALIGNANT NEOPLASM OF COLON: ICD-10-CM

## 2024-06-12 PROCEDURE — 3700000001 HC GENERAL ANESTHESIA TIME - INITIAL BASE CHARGE

## 2024-06-12 PROCEDURE — 3700000002 HC GENERAL ANESTHESIA TIME - EACH INCREMENTAL 1 MINUTE

## 2024-06-12 PROCEDURE — 2500000004 HC RX 250 GENERAL PHARMACY W/ HCPCS (ALT 636 FOR OP/ED): Performed by: INTERNAL MEDICINE

## 2024-06-12 PROCEDURE — 45385 COLONOSCOPY W/LESION REMOVAL: CPT | Performed by: INTERNAL MEDICINE

## 2024-06-12 PROCEDURE — 2500000005 HC RX 250 GENERAL PHARMACY W/O HCPCS: Performed by: NURSE ANESTHETIST, CERTIFIED REGISTERED

## 2024-06-12 PROCEDURE — 2720000007 HC OR 272 NO HCPCS

## 2024-06-12 PROCEDURE — 7100000009 HC PHASE TWO TIME - INITIAL BASE CHARGE

## 2024-06-12 PROCEDURE — 2500000004 HC RX 250 GENERAL PHARMACY W/ HCPCS (ALT 636 FOR OP/ED): Performed by: NURSE ANESTHETIST, CERTIFIED REGISTERED

## 2024-06-12 PROCEDURE — 7100000010 HC PHASE TWO TIME - EACH INCREMENTAL 1 MINUTE

## 2024-06-12 RX ORDER — METOCLOPRAMIDE HYDROCHLORIDE 5 MG/ML
INJECTION INTRAMUSCULAR; INTRAVENOUS AS NEEDED
Status: DISCONTINUED | OUTPATIENT
Start: 2024-06-12 | End: 2024-06-12

## 2024-06-12 RX ORDER — LIDOCAINE HYDROCHLORIDE 20 MG/ML
INJECTION, SOLUTION INFILTRATION; PERINEURAL AS NEEDED
Status: DISCONTINUED | OUTPATIENT
Start: 2024-06-12 | End: 2024-06-12

## 2024-06-12 RX ORDER — PROPOFOL 10 MG/ML
INJECTION, EMULSION INTRAVENOUS AS NEEDED
Status: DISCONTINUED | OUTPATIENT
Start: 2024-06-12 | End: 2024-06-12

## 2024-06-12 RX ORDER — SODIUM CHLORIDE 9 MG/ML
20 INJECTION, SOLUTION INTRAVENOUS CONTINUOUS
Status: DISCONTINUED | OUTPATIENT
Start: 2024-06-12 | End: 2024-06-13 | Stop reason: HOSPADM

## 2024-06-12 SDOH — HEALTH STABILITY: MENTAL HEALTH: CURRENT SMOKER: 0

## 2024-06-12 ASSESSMENT — PAIN SCALES - GENERAL
PAIN_LEVEL: 0
PAINLEVEL_OUTOF10: 0 - NO PAIN

## 2024-06-12 ASSESSMENT — COLUMBIA-SUICIDE SEVERITY RATING SCALE - C-SSRS
6. HAVE YOU EVER DONE ANYTHING, STARTED TO DO ANYTHING, OR PREPARED TO DO ANYTHING TO END YOUR LIFE?: NO
2. HAVE YOU ACTUALLY HAD ANY THOUGHTS OF KILLING YOURSELF?: NO
1. IN THE PAST MONTH, HAVE YOU WISHED YOU WERE DEAD OR WISHED YOU COULD GO TO SLEEP AND NOT WAKE UP?: NO

## 2024-06-12 ASSESSMENT — PAIN - FUNCTIONAL ASSESSMENT: PAIN_FUNCTIONAL_ASSESSMENT: 0-10

## 2024-06-12 NOTE — ANESTHESIA PREPROCEDURE EVALUATION
Patient: Heri Beasley    Procedure Information       Date/Time: 06/12/24 1000    Scheduled providers: Bryan Drummond MD    Procedure: COLONOSCOPY    Location: [facility]            Relevant Problems   Anesthesia (within normal limits)      Cardiac   (+) Coronary arteriosclerosis   (+) Essential hypertension   (+) Mixed hyperlipidemia      Pulmonary   (+) Moderate chronic obstructive pulmonary disease (Multi)      Endocrine   (+) Hypothyroidism   (+) Type 2 diabetes mellitus (Multi)       Clinical information reviewed:   Tobacco  Allergies  Meds   Med Hx  Surg Hx   Fam Hx  Soc Hx        NPO Detail:  NPO/Void Status  Carbohydrate Drink Given Prior to Surgery? : N  Date of Last Liquid: 06/12/24  Time of Last Liquid: 0800  Date of Last Solid: 06/09/24  Time of Last Solid: 1800  Last Intake Type: Clear fluids  Time of Last Void: 0904         Physical Exam    Airway  Mallampati: III  TM distance: >3 FB  Neck ROM: full     Cardiovascular - normal exam     Dental   (+) upper dentures     Pulmonary - normal exam     Abdominal   (+) obese             Anesthesia Plan    History of general anesthesia?: yes  History of complications of general anesthesia?: no    ASA 3     MAC     The patient is not a current smoker.    intravenous induction   Anesthetic plan and risks discussed with patient.    Plan discussed with CRNA.

## 2024-06-12 NOTE — ANESTHESIA POSTPROCEDURE EVALUATION
Patient: Heri Beasley    Procedure Summary       Date: 06/12/24 Room / Location: [facility]    Anesthesia Start: 1003 Anesthesia Stop: 1029    Procedure: COLONOSCOPY Diagnosis: Screening for malignant neoplasm of colon    Scheduled Providers: Bryan Drummond MD Responsible Provider: CARLENE Pardo    Anesthesia Type: MAC ASA Status: 3            Anesthesia Type: MAC    Vitals Value Taken Time   /72 06/12/24 1029   Temp 97.1 06/12/24 1029   Pulse 69 06/12/24 1029   Resp 16 06/12/24 1029   SpO2 99% 06/12/24 1029       Anesthesia Post Evaluation    Patient location during evaluation: PACU  Patient participation: complete - patient cannot participate  Level of consciousness: responsive to verbal stimuli  Pain score: 0  Pain management: adequate  Airway patency: patent  Cardiovascular status: acceptable and hemodynamically stable  Respiratory status: acceptable, spontaneous ventilation and nasal cannula  Hydration status: acceptable  Postoperative Nausea and Vomiting: none        There were no known notable events for this encounter.

## 2024-06-13 NOTE — ADDENDUM NOTE
Encounter addended by: Anna White RN on: 6/13/2024 12:26 PM   Actions taken: Contacts section saved, Flowsheet accepted

## 2024-06-20 LAB
LABORATORY COMMENT REPORT: NORMAL
PATH REPORT.FINAL DX SPEC: NORMAL
PATH REPORT.GROSS SPEC: NORMAL
PATH REPORT.TOTAL CANCER: NORMAL

## 2024-07-03 ENCOUNTER — APPOINTMENT (OUTPATIENT)
Dept: RADIOLOGY | Facility: HOSPITAL | Age: 74
End: 2024-07-03
Payer: MEDICARE

## 2024-07-03 ENCOUNTER — HOSPITAL ENCOUNTER (EMERGENCY)
Facility: HOSPITAL | Age: 74
Discharge: HOME | End: 2024-07-03
Attending: EMERGENCY MEDICINE
Payer: MEDICARE

## 2024-07-03 VITALS
DIASTOLIC BLOOD PRESSURE: 83 MMHG | SYSTOLIC BLOOD PRESSURE: 175 MMHG | WEIGHT: 210 LBS | OXYGEN SATURATION: 99 % | HEIGHT: 70 IN | TEMPERATURE: 97.7 F | HEART RATE: 60 BPM | RESPIRATION RATE: 18 BRPM | BODY MASS INDEX: 30.06 KG/M2

## 2024-07-03 DIAGNOSIS — N20.1 URETEROLITHIASIS: Primary | ICD-10-CM

## 2024-07-03 LAB
ANION GAP SERPL CALC-SCNC: 9 MMOL/L (ref 10–20)
APPEARANCE UR: ABNORMAL
BACTERIA #/AREA URNS AUTO: ABNORMAL /HPF
BASOPHILS # BLD AUTO: 0.03 X10*3/UL (ref 0–0.1)
BASOPHILS NFR BLD AUTO: 0.3 %
BILIRUB UR STRIP.AUTO-MCNC: NEGATIVE MG/DL
BUN SERPL-MCNC: 20 MG/DL (ref 6–23)
CALCIUM SERPL-MCNC: 9.2 MG/DL (ref 8.6–10.3)
CHLORIDE SERPL-SCNC: 103 MMOL/L (ref 98–107)
CO2 SERPL-SCNC: 28 MMOL/L (ref 21–32)
COLOR UR: ABNORMAL
CREAT SERPL-MCNC: 1.12 MG/DL (ref 0.5–1.3)
EGFRCR SERPLBLD CKD-EPI 2021: 70 ML/MIN/1.73M*2
EOSINOPHIL # BLD AUTO: 0.04 X10*3/UL (ref 0–0.4)
EOSINOPHIL NFR BLD AUTO: 0.5 %
ERYTHROCYTE [DISTWIDTH] IN BLOOD BY AUTOMATED COUNT: 13.5 % (ref 11.5–14.5)
GLUCOSE SERPL-MCNC: 131 MG/DL (ref 74–99)
GLUCOSE UR STRIP.AUTO-MCNC: NORMAL MG/DL
HCT VFR BLD AUTO: 44.8 % (ref 41–52)
HGB BLD-MCNC: 14.9 G/DL (ref 13.5–17.5)
IMM GRANULOCYTES # BLD AUTO: 0.02 X10*3/UL (ref 0–0.5)
IMM GRANULOCYTES NFR BLD AUTO: 0.2 % (ref 0–0.9)
KETONES UR STRIP.AUTO-MCNC: NEGATIVE MG/DL
LEUKOCYTE ESTERASE UR QL STRIP.AUTO: NEGATIVE
LYMPHOCYTES # BLD AUTO: 0.76 X10*3/UL (ref 0.8–3)
LYMPHOCYTES NFR BLD AUTO: 8.6 %
MCH RBC QN AUTO: 29.1 PG (ref 26–34)
MCHC RBC AUTO-ENTMCNC: 33.3 G/DL (ref 32–36)
MCV RBC AUTO: 88 FL (ref 80–100)
MONOCYTES # BLD AUTO: 0.34 X10*3/UL (ref 0.05–0.8)
MONOCYTES NFR BLD AUTO: 3.9 %
MUCOUS THREADS #/AREA URNS AUTO: ABNORMAL /LPF
NEUTROPHILS # BLD AUTO: 7.63 X10*3/UL (ref 1.6–5.5)
NEUTROPHILS NFR BLD AUTO: 86.5 %
NITRITE UR QL STRIP.AUTO: NEGATIVE
NRBC BLD-RTO: 0 /100 WBCS (ref 0–0)
PH UR STRIP.AUTO: 5.5 [PH]
PLATELET # BLD AUTO: 268 X10*3/UL (ref 150–450)
POTASSIUM SERPL-SCNC: 4.5 MMOL/L (ref 3.5–5.3)
PROT UR STRIP.AUTO-MCNC: ABNORMAL MG/DL
RBC # BLD AUTO: 5.12 X10*6/UL (ref 4.5–5.9)
RBC # UR STRIP.AUTO: ABNORMAL /UL
RBC #/AREA URNS AUTO: >20 /HPF
SODIUM SERPL-SCNC: 135 MMOL/L (ref 136–145)
SP GR UR STRIP.AUTO: 1.01
UROBILINOGEN UR STRIP.AUTO-MCNC: NORMAL MG/DL
WBC # BLD AUTO: 8.8 X10*3/UL (ref 4.4–11.3)
WBC #/AREA URNS AUTO: ABNORMAL /HPF

## 2024-07-03 PROCEDURE — 2500000004 HC RX 250 GENERAL PHARMACY W/ HCPCS (ALT 636 FOR OP/ED): Performed by: EMERGENCY MEDICINE

## 2024-07-03 PROCEDURE — 36415 COLL VENOUS BLD VENIPUNCTURE: CPT | Performed by: EMERGENCY MEDICINE

## 2024-07-03 PROCEDURE — 85025 COMPLETE CBC W/AUTO DIFF WBC: CPT | Performed by: EMERGENCY MEDICINE

## 2024-07-03 PROCEDURE — 96375 TX/PRO/DX INJ NEW DRUG ADDON: CPT

## 2024-07-03 PROCEDURE — 74176 CT ABD & PELVIS W/O CONTRAST: CPT | Performed by: RADIOLOGY

## 2024-07-03 PROCEDURE — 96374 THER/PROPH/DIAG INJ IV PUSH: CPT

## 2024-07-03 PROCEDURE — 80048 BASIC METABOLIC PNL TOTAL CA: CPT | Performed by: EMERGENCY MEDICINE

## 2024-07-03 PROCEDURE — 81003 URINALYSIS AUTO W/O SCOPE: CPT | Performed by: EMERGENCY MEDICINE

## 2024-07-03 PROCEDURE — 99284 EMERGENCY DEPT VISIT MOD MDM: CPT | Mod: 25

## 2024-07-03 PROCEDURE — 74176 CT ABD & PELVIS W/O CONTRAST: CPT

## 2024-07-03 PROCEDURE — 96361 HYDRATE IV INFUSION ADD-ON: CPT

## 2024-07-03 RX ORDER — KETOROLAC TROMETHAMINE 30 MG/ML
15 INJECTION, SOLUTION INTRAMUSCULAR; INTRAVENOUS ONCE
Status: COMPLETED | OUTPATIENT
Start: 2024-07-03 | End: 2024-07-03

## 2024-07-03 RX ORDER — ONDANSETRON HYDROCHLORIDE 2 MG/ML
4 INJECTION, SOLUTION INTRAVENOUS ONCE
Status: COMPLETED | OUTPATIENT
Start: 2024-07-03 | End: 2024-07-03

## 2024-07-03 RX ORDER — HYDROCODONE BITARTRATE AND ACETAMINOPHEN 5; 325 MG/1; MG/1
1 TABLET ORAL EVERY 4 HOURS PRN
Qty: 12 TABLET | Refills: 0 | Status: SHIPPED | OUTPATIENT
Start: 2024-07-03 | End: 2024-07-06

## 2024-07-03 ASSESSMENT — PAIN DESCRIPTION - ORIENTATION
ORIENTATION: LEFT
ORIENTATION: LEFT

## 2024-07-03 ASSESSMENT — PAIN SCALES - GENERAL
PAINLEVEL_OUTOF10: 8
PAINLEVEL_OUTOF10: 3
PAINLEVEL_OUTOF10: 2
PAINLEVEL_OUTOF10: 8

## 2024-07-03 ASSESSMENT — PAIN DESCRIPTION - PROGRESSION: CLINICAL_PROGRESSION: GRADUALLY IMPROVING

## 2024-07-03 ASSESSMENT — PAIN - FUNCTIONAL ASSESSMENT
PAIN_FUNCTIONAL_ASSESSMENT: 0-10

## 2024-07-03 ASSESSMENT — LIFESTYLE VARIABLES
EVER HAD A DRINK FIRST THING IN THE MORNING TO STEADY YOUR NERVES TO GET RID OF A HANGOVER: NO
HAVE YOU EVER FELT YOU SHOULD CUT DOWN ON YOUR DRINKING: NO
HAVE PEOPLE ANNOYED YOU BY CRITICIZING YOUR DRINKING: NO
TOTAL SCORE: 0
EVER FELT BAD OR GUILTY ABOUT YOUR DRINKING: NO

## 2024-07-03 ASSESSMENT — COLUMBIA-SUICIDE SEVERITY RATING SCALE - C-SSRS
2. HAVE YOU ACTUALLY HAD ANY THOUGHTS OF KILLING YOURSELF?: NO
1. IN THE PAST MONTH, HAVE YOU WISHED YOU WERE DEAD OR WISHED YOU COULD GO TO SLEEP AND NOT WAKE UP?: NO
6. HAVE YOU EVER DONE ANYTHING, STARTED TO DO ANYTHING, OR PREPARED TO DO ANYTHING TO END YOUR LIFE?: NO

## 2024-07-03 ASSESSMENT — PAIN DESCRIPTION - LOCATION
LOCATION: ABDOMEN
LOCATION: ABDOMEN

## 2024-07-03 ASSESSMENT — PAIN DESCRIPTION - PAIN TYPE
TYPE: ACUTE PAIN
TYPE: ACUTE PAIN

## 2024-07-03 NOTE — ED PROVIDER NOTES
HPI   Chief Complaint   Patient presents with   • Flank Pain   • Blood in Urine       Presents with left flank and left lower quadrant abdominal pain as well as hematuria.  Started at 3 AM this morning when he had hematuria.  He states it got darker around 8 AM.  Started having pain in the left lower quadrant.  He had some nausea with this.  He states he had 1 urination right for he came here and it was a little bit lighter but still bloody.  He denies any history of kidney stones in the past.  He feels slightly nauseated but no vomiting.  No diarrhea or constipation at this time.  Denies any recent fevers.  He has had a cholecystectomy in the past.                          [place] Coma Scale Score: 15                  Patient History   Past Medical History:   Diagnosis Date   • Hyperlipidemia    • Hypertension    • Myocardial infarction (Multi)      Past Surgical History:   Procedure Laterality Date   • CHOLECYSTECTOMY  1999     Family History   Problem Relation Name Age of Onset   • Diabetes Mother     • Prostate cancer Father       Social History     Tobacco Use   • Smoking status: Former     Current packs/day: 0.00     Types: Cigarettes     Quit date:      Years since quittin.5   • Smokeless tobacco: Never   Vaping Use   • Vaping status: Never Used   Substance Use Topics   • Alcohol use: Not Currently   • Drug use: Never       Physical Exam   ED Triage Vitals [24 1110]   Temperature Heart Rate Respirations BP   36.5 °C (97.7 °F) 60 18 175/83      Pulse Ox Temp src Heart Rate Source Patient Position   99 % -- -- --      BP Location FiO2 (%)     -- --       Physical Exam  Vitals and nursing note reviewed.   Constitutional:       Appearance: Normal appearance.   HENT:      Head: Normocephalic and atraumatic.      Mouth/Throat:      Mouth: Mucous membranes are moist.   Eyes:      Extraocular Movements: Extraocular movements intact.      Pupils: Pupils are equal, round, and reactive to light.    Cardiovascular:      Rate and Rhythm: Normal rate and regular rhythm.      Heart sounds: No murmur heard.  Pulmonary:      Effort: Pulmonary effort is normal. No respiratory distress.      Breath sounds: Normal breath sounds.   Abdominal:      General: There is no distension.      Palpations: Abdomen is soft.      Tenderness: There is abdominal tenderness (LLQ).   Musculoskeletal:         General: No tenderness or deformity. Normal range of motion.      Right lower leg: No edema.      Left lower leg: No edema.   Skin:     General: Skin is warm and dry.      Findings: No lesion or rash.   Neurological:      General: No focal deficit present.      Mental Status: He is alert and oriented to person, place, and time.      Sensory: No sensory deficit.      Motor: No weakness.   Psychiatric:         Behavior: Behavior normal.       Labs Reviewed   CBC WITH AUTO DIFFERENTIAL   BASIC METABOLIC PANEL   URINALYSIS WITH REFLEX CULTURE AND MICROSCOPIC    Narrative:     The following orders were created for panel order Urinalysis with Reflex Culture and Microscopic.  Procedure                               Abnormality         Status                     ---------                               -----------         ------                     Urinalysis with Reflex C...[[id number]]                                                 Extra Urine Gray Tube[[id number]]                                                         Please view results for these tests on the individual orders.   URINALYSIS WITH REFLEX CULTURE AND MICROSCOPIC   EXTRA URINE GRAY TUBE     CT abdomen pelvis wo IV contrast    (Results Pending)     ED Course & MDM   Diagnoses as of 07/03/24 1407   Ureterolithiasis       Medical Decision Making  Differentials include ureterolithiasis, pyelonephritis, UTI.  Imaging studies, if performed, were independently reviewed and interpreted by myself and confirmed by radiologist. EKG(s), if performed, were interpreted by  myself.Patient was given Toradol and IV normal saline.  Laboratory studies show a normal WBC count.  Creatinine is normal.  Sodium is 135.  Urinalysis had no leukocytes or nitrates but did have blood.  CAT scan shows a 2 mm calculus in the left proximal ureter causing some mild hydronephrosis.  Upon reevaluation at 1403, the patient's pain is improved.  At this time I feel that he can be treated as an outpatient.  I will give him Norco to take for pain at home.  He will also be given urology follow-up.        Procedure  Procedures     Barrett Lowry MD  07/03/24 6630

## 2024-07-04 LAB — HOLD SPECIMEN: NORMAL

## 2024-09-24 ENCOUNTER — LAB (OUTPATIENT)
Dept: LAB | Facility: LAB | Age: 74
End: 2024-09-24
Payer: MEDICARE

## 2024-09-24 DIAGNOSIS — E11.9 TYPE 2 DIABETES MELLITUS WITHOUT COMPLICATION, WITHOUT LONG-TERM CURRENT USE OF INSULIN (MULTI): ICD-10-CM

## 2024-09-24 DIAGNOSIS — Z00.00 MEDICARE ANNUAL WELLNESS VISIT, SUBSEQUENT: ICD-10-CM

## 2024-09-24 DIAGNOSIS — E03.9 HYPOTHYROIDISM, UNSPECIFIED TYPE: ICD-10-CM

## 2024-09-24 DIAGNOSIS — Z12.11 SCREENING FOR MALIGNANT NEOPLASM OF COLON: ICD-10-CM

## 2024-09-24 DIAGNOSIS — Z00.00 ROUTINE ADULT HEALTH MAINTENANCE: ICD-10-CM

## 2024-09-24 DIAGNOSIS — Z78.9 FULL CODE STATUS: ICD-10-CM

## 2024-09-24 LAB
ALBUMIN SERPL BCP-MCNC: 3.8 G/DL (ref 3.4–5)
ALP SERPL-CCNC: 101 U/L (ref 33–136)
ALT SERPL W P-5'-P-CCNC: 17 U/L (ref 10–52)
ANION GAP SERPL CALC-SCNC: 8 MMOL/L (ref 10–20)
AST SERPL W P-5'-P-CCNC: 14 U/L (ref 9–39)
BILIRUB SERPL-MCNC: 0.7 MG/DL (ref 0–1.2)
BUN SERPL-MCNC: 15 MG/DL (ref 6–23)
CALCIUM SERPL-MCNC: 8.8 MG/DL (ref 8.6–10.3)
CHLORIDE SERPL-SCNC: 100 MMOL/L (ref 98–107)
CHOLEST SERPL-MCNC: 103 MG/DL (ref 0–199)
CHOLESTEROL/HDL RATIO: 2.7
CO2 SERPL-SCNC: 31 MMOL/L (ref 21–32)
CREAT SERPL-MCNC: 0.77 MG/DL (ref 0.5–1.3)
EGFRCR SERPLBLD CKD-EPI 2021: >90 ML/MIN/1.73M*2
EST. AVERAGE GLUCOSE BLD GHB EST-MCNC: 134 MG/DL
GLUCOSE SERPL-MCNC: 104 MG/DL (ref 74–99)
HBA1C MFR BLD: 6.3 %
HDLC SERPL-MCNC: 37.7 MG/DL
LDLC SERPL CALC-MCNC: 50 MG/DL
NON HDL CHOLESTEROL: 65 MG/DL (ref 0–149)
POTASSIUM SERPL-SCNC: 4.3 MMOL/L (ref 3.5–5.3)
PROT SERPL-MCNC: 6.7 G/DL (ref 6.4–8.2)
SODIUM SERPL-SCNC: 135 MMOL/L (ref 136–145)
T4 FREE SERPL-MCNC: 1.09 NG/DL (ref 0.61–1.12)
TRIGL SERPL-MCNC: 78 MG/DL (ref 0–149)
TSH SERPL-ACNC: 4.48 MIU/L (ref 0.44–3.98)
VLDL: 16 MG/DL (ref 0–40)

## 2024-09-24 PROCEDURE — 80061 LIPID PANEL: CPT

## 2024-09-24 PROCEDURE — 36415 COLL VENOUS BLD VENIPUNCTURE: CPT

## 2024-09-24 PROCEDURE — 84443 ASSAY THYROID STIM HORMONE: CPT

## 2024-09-24 PROCEDURE — 80053 COMPREHEN METABOLIC PANEL: CPT

## 2024-09-24 PROCEDURE — 84439 ASSAY OF FREE THYROXINE: CPT

## 2024-09-24 PROCEDURE — 83036 HEMOGLOBIN GLYCOSYLATED A1C: CPT

## 2024-10-02 ENCOUNTER — APPOINTMENT (OUTPATIENT)
Dept: PRIMARY CARE | Facility: CLINIC | Age: 74
End: 2024-10-02
Payer: MEDICARE

## 2024-10-02 VITALS
BODY MASS INDEX: 30.42 KG/M2 | WEIGHT: 212 LBS | DIASTOLIC BLOOD PRESSURE: 84 MMHG | TEMPERATURE: 97.4 F | OXYGEN SATURATION: 96 % | SYSTOLIC BLOOD PRESSURE: 124 MMHG | HEART RATE: 64 BPM

## 2024-10-02 DIAGNOSIS — E03.9 HYPOTHYROIDISM, UNSPECIFIED TYPE: ICD-10-CM

## 2024-10-02 DIAGNOSIS — I25.10 CORONARY ARTERIOSCLEROSIS: ICD-10-CM

## 2024-10-02 DIAGNOSIS — I10 ESSENTIAL HYPERTENSION: ICD-10-CM

## 2024-10-02 DIAGNOSIS — E11.9 TYPE 2 DIABETES MELLITUS WITHOUT COMPLICATION, WITHOUT LONG-TERM CURRENT USE OF INSULIN (MULTI): ICD-10-CM

## 2024-10-02 PROCEDURE — 1159F MED LIST DOCD IN RCRD: CPT | Performed by: FAMILY MEDICINE

## 2024-10-02 PROCEDURE — 99214 OFFICE O/P EST MOD 30 MIN: CPT | Performed by: FAMILY MEDICINE

## 2024-10-02 PROCEDURE — 3044F HG A1C LEVEL LT 7.0%: CPT | Performed by: FAMILY MEDICINE

## 2024-10-02 PROCEDURE — 1036F TOBACCO NON-USER: CPT | Performed by: FAMILY MEDICINE

## 2024-10-02 PROCEDURE — 3079F DIAST BP 80-89 MM HG: CPT | Performed by: FAMILY MEDICINE

## 2024-10-02 PROCEDURE — 3074F SYST BP LT 130 MM HG: CPT | Performed by: FAMILY MEDICINE

## 2024-10-02 PROCEDURE — 3048F LDL-C <100 MG/DL: CPT | Performed by: FAMILY MEDICINE

## 2024-10-02 PROCEDURE — 1123F ACP DISCUSS/DSCN MKR DOCD: CPT | Performed by: FAMILY MEDICINE

## 2024-10-02 RX ORDER — METOPROLOL TARTRATE 50 MG/1
50 TABLET ORAL 2 TIMES DAILY
Qty: 180 TABLET | Refills: 3 | Status: SHIPPED | OUTPATIENT
Start: 2024-10-02 | End: 2025-10-02

## 2024-10-02 RX ORDER — ATORVASTATIN CALCIUM 40 MG/1
40 TABLET, FILM COATED ORAL NIGHTLY
Qty: 90 TABLET | Refills: 3 | Status: SHIPPED | OUTPATIENT
Start: 2024-10-02 | End: 2025-10-02

## 2024-10-02 RX ORDER — LEVOTHYROXINE SODIUM 112 UG/1
112 TABLET ORAL DAILY
Qty: 90 TABLET | Refills: 3 | Status: SHIPPED | OUTPATIENT
Start: 2024-10-02 | End: 2025-10-02

## 2024-10-02 NOTE — PROGRESS NOTES
Subjective   Patient ID: Heri Beasley is a 73 y.o. male who presents for No chief complaint on file..  HPI    Patient Active Problem List   Diagnosis    Coronary arteriosclerosis    Essential hypertension    Mixed hyperlipidemia    Hypothyroidism    Moderate chronic obstructive pulmonary disease (Multi)    Type 2 diabetes mellitus       Social Connections: Not on file       Current Outpatient Medications on File Prior to Visit   Medication Sig Dispense Refill    aspirin 81 mg EC tablet       atorvastatin (Lipitor) 40 mg tablet Take 1 tablet (40 mg) by mouth once daily at bedtime. 90 each 3    levothyroxine (Synthroid, Levoxyl) 112 mcg tablet Take 1 tablet (112 mcg) by mouth once daily. 90 tablet 3    metoprolol tartrate (Lopressor) 50 mg tablet Take 1 tablet by mouth 2 times a day. 180 each 3     No current facility-administered medications on file prior to visit.        There were no vitals filed for this visit.  There were no vitals filed for this visit.    Review of Systems    Objective     Physical Exam    Lab on 09/24/2024   Component Date Value Ref Range Status    Glucose 09/24/2024 104 (H)  74 - 99 mg/dL Final    Sodium 09/24/2024 135 (L)  136 - 145 mmol/L Final    Potassium 09/24/2024 4.3  3.5 - 5.3 mmol/L Final    Chloride 09/24/2024 100  98 - 107 mmol/L Final    Bicarbonate 09/24/2024 31  21 - 32 mmol/L Final    Anion Gap 09/24/2024 8 (L)  10 - 20 mmol/L Final    Urea Nitrogen 09/24/2024 15  6 - 23 mg/dL Final    Creatinine 09/24/2024 0.77  0.50 - 1.30 mg/dL Final    eGFR 09/24/2024 >90  >60 mL/min/1.73m*2 Final    Calculations of estimated GFR are performed using the 2021 CKD-EPI Study Refit equation without the race variable for the IDMS-Traceable creatinine methods.  https://jasn.asnjournals.org/content/early/2021/09/22/ASN.3938658572    Calcium 09/24/2024 8.8  8.6 - 10.3 mg/dL Final    Albumin 09/24/2024 3.8  3.4 - 5.0 g/dL Final    Alkaline Phosphatase 09/24/2024 101  33 - 136 U/L Final     Total Protein 09/24/2024 6.7  6.4 - 8.2 g/dL Final    AST 09/24/2024 14  9 - 39 U/L Final    Bilirubin, Total 09/24/2024 0.7  0.0 - 1.2 mg/dL Final    ALT 09/24/2024 17  10 - 52 U/L Final    Patients treated with Sulfasalazine may generate falsely decreased results for ALT.    Cholesterol 09/24/2024 103  0 - 199 mg/dL Final          Age      Desirable   Borderline High   High     0-19 Y     0 - 169       170 - 199     >/= 200    20-24 Y     0 - 189       190 - 224     >/= 225         >24 Y     0 - 199       200 - 239     >/= 240   **All ranges are based on fasting samples. Specific   therapeutic targets will vary based on patient-specific   cardiac risk.    Pediatric guidelines reference:Pediatrics 2011, 128(S5).Adult guidelines reference: NCEP ATPIII Guidelines,JANNIE 2001, 258:2486-30    Venipuncture immediately after or during the administration of Metamizole may lead to falsely low results. Testing should be performed immediately prior to Metamizole dosing.    HDL-Cholesterol 09/24/2024 37.7  mg/dL Final      Age       Very Low   Low     Normal    High    0-19 Y    < 35      < 40     40-45     ----  20-24 Y    ----     < 40      >45      ----        >24 Y      ----     < 40     40-60      >60      Cholesterol/HDL Ratio 09/24/2024 2.7   Final      Ref Values  Desirable  < 3.4  High Risk  > 5.0    LDL Calculated 09/24/2024 50  <=99 mg/dL Final                                Near   Borderline      AGE      Desirable  Optimal    High     High     Very High     0-19 Y     0 - 109     ---    110-129   >/= 130     ----    20-24 Y     0 - 119     ---    120-159   >/= 160     ----      >24 Y     0 -  99   100-129  130-159   160-189     >/=190      VLDL 09/24/2024 16  0 - 40 mg/dL Final    Triglycerides 09/24/2024 78  0 - 149 mg/dL Final       Age         Desirable   Borderline High   High     Very High   0 D-90 D    19 - 174         ----         ----        ----  91 D- 9 Y     0 -  74        75 -  99     >/= 100       ----    10-19 Y     0 -  89        90 - 129     >/= 130      ----    20-24 Y     0 - 114       115 - 149     >/= 150      ----         >24 Y     0 - 149       150 - 199    200- 499    >/= 500    Venipuncture immediately after or during the administration of Metamizole may lead to falsely low results. Testing should be performed immediately prior to Metamizole dosing.    Non HDL Cholesterol 09/24/2024 65  0 - 149 mg/dL Final          Age       Desirable   Borderline High   High     Very High     0-19 Y     0 - 119       120 - 144     >/= 145    >/= 160    20-24 Y     0 - 149       150 - 189     >/= 190      ----         >24 Y    30 mg/dL above LDL Cholesterol goal      Hemoglobin A1C 09/24/2024 6.3 (H)  See comment % Final    Estimated Average Glucose 09/24/2024 134  Not Established mg/dL Final    Thyroid Stimulating Hormone 09/24/2024 4.48 (H)  0.44 - 3.98 mIU/L Final    Thyroxine, Free 09/24/2024 1.09  0.61 - 1.12 ng/dL Final       Assessment/Plan

## 2024-10-02 NOTE — PROGRESS NOTES
Subjective   Reason for Visit: Heri Beasley is an 73 y.o. male here for a Medicare Wellness visit.     Past Medical, Surgical, and Family History reviewed and updated in chart.    Reviewed all medications by prescribing practitioner or clinical pharmacist (such as prescriptions, OTCs, herbal therapies and supplements) and documented in the medical record.    HPI:     1. COPD: stable    2. DM2: a1c was 6.4.      3. hypothyroid: stable    Patient Care Team:  Fred Ramon MD as PCP - General             Review of Systems   All other systems reviewed and are negative.      Objective   Vitals:  /84   Pulse 64   Temp 36.3 °C (97.4 °F)   Wt 96.2 kg (212 lb)   SpO2 96%   BMI 30.42 kg/m²       Physical Exam  Vitals reviewed.   Constitutional:       General: He is not in acute distress.     Appearance: Normal appearance. He is well-developed. He is not diaphoretic.   HENT:      Head: Normocephalic and atraumatic.      Right Ear: Tympanic membrane normal.      Left Ear: Tympanic membrane normal.      Nose: Nose normal.      Mouth/Throat:      Mouth: Mucous membranes are moist.   Eyes:      Pupils: Pupils are equal, round, and reactive to light.   Cardiovascular:      Rate and Rhythm: Normal rate and regular rhythm.      Heart sounds: Normal heart sounds. No murmur heard.     No friction rub. No gallop.   Pulmonary:      Effort: Pulmonary effort is normal.      Breath sounds: Normal breath sounds. No rales.   Abdominal:      General: Bowel sounds are normal.      Palpations: Abdomen is soft.      Tenderness: There is no abdominal tenderness.   Musculoskeletal:      Cervical back: Normal range of motion and neck supple.   Skin:     General: Skin is warm and dry.   Neurological:      Mental Status: He is alert.   Psychiatric:         Mood and Affect: Mood normal.         Assessment/Plan   Problem List Items Addressed This Visit       Coronary arteriosclerosis    Overview     Note: fe         Relevant  Medications    atorvastatin (Lipitor) 40 mg tablet    metoprolol tartrate (Lopressor) 50 mg tablet    Essential hypertension    Overview     Note: bw         Relevant Medications    metoprolol tartrate (Lopressor) 50 mg tablet    Hypothyroidism    Overview     Note: aa         Relevant Medications    levothyroxine (Synthroid, Levoxyl) 112 mcg tablet    Type 2 diabetes mellitus    Relevant Orders    POCT Albumin Random Urine manually resulted    Comprehensive metabolic panel    Lipid panel    Hemoglobin A1c     Patient is doing well.  Refilled pts meds.  Follow up in 6 mo.

## 2025-02-23 ENCOUNTER — APPOINTMENT (OUTPATIENT)
Dept: RADIOLOGY | Facility: HOSPITAL | Age: 75
End: 2025-02-23
Payer: MEDICARE

## 2025-02-23 ENCOUNTER — HOSPITAL ENCOUNTER (EMERGENCY)
Facility: HOSPITAL | Age: 75
Discharge: HOME | End: 2025-02-23
Payer: MEDICARE

## 2025-02-23 VITALS
HEIGHT: 70 IN | HEART RATE: 60 BPM | RESPIRATION RATE: 18 BRPM | BODY MASS INDEX: 30.06 KG/M2 | WEIGHT: 210 LBS | SYSTOLIC BLOOD PRESSURE: 127 MMHG | OXYGEN SATURATION: 95 % | DIASTOLIC BLOOD PRESSURE: 80 MMHG | TEMPERATURE: 97.5 F

## 2025-02-23 DIAGNOSIS — J10.1 INFLUENZA A: Primary | ICD-10-CM

## 2025-02-23 LAB
FLUAV RNA RESP QL NAA+PROBE: DETECTED
FLUBV RNA RESP QL NAA+PROBE: NOT DETECTED
RSV RNA RESP QL NAA+PROBE: NOT DETECTED
SARS-COV-2 RNA RESP QL NAA+PROBE: NOT DETECTED

## 2025-02-23 PROCEDURE — 2500000004 HC RX 250 GENERAL PHARMACY W/ HCPCS (ALT 636 FOR OP/ED): Performed by: NURSE PRACTITIONER

## 2025-02-23 PROCEDURE — 2500000002 HC RX 250 W HCPCS SELF ADMINISTERED DRUGS (ALT 637 FOR MEDICARE OP, ALT 636 FOR OP/ED): Mod: MUE | Performed by: NURSE PRACTITIONER

## 2025-02-23 PROCEDURE — 71046 X-RAY EXAM CHEST 2 VIEWS: CPT

## 2025-02-23 PROCEDURE — 94640 AIRWAY INHALATION TREATMENT: CPT

## 2025-02-23 PROCEDURE — 87637 SARSCOV2&INF A&B&RSV AMP PRB: CPT | Performed by: NURSE PRACTITIONER

## 2025-02-23 PROCEDURE — 71046 X-RAY EXAM CHEST 2 VIEWS: CPT | Performed by: RADIOLOGY

## 2025-02-23 PROCEDURE — 99284 EMERGENCY DEPT VISIT MOD MDM: CPT | Mod: 25

## 2025-02-23 RX ORDER — ALBUTEROL SULFATE 0.83 MG/ML
2.5 SOLUTION RESPIRATORY (INHALATION) ONCE
Status: COMPLETED | OUTPATIENT
Start: 2025-02-23 | End: 2025-02-23

## 2025-02-23 RX ORDER — PREDNISONE 10 MG/1
60 TABLET ORAL ONCE
Status: COMPLETED | OUTPATIENT
Start: 2025-02-23 | End: 2025-02-23

## 2025-02-23 RX ORDER — ALBUTEROL SULFATE 90 UG/1
2 INHALANT RESPIRATORY (INHALATION) EVERY 4 HOURS PRN
Qty: 3.5 G | Refills: 0 | Status: SHIPPED | OUTPATIENT
Start: 2025-02-23

## 2025-02-23 RX ORDER — PREDNISONE 20 MG/1
40 TABLET ORAL DAILY
Qty: 10 TABLET | Refills: 0 | Status: SHIPPED | OUTPATIENT
Start: 2025-02-23 | End: 2025-02-28

## 2025-02-23 RX ORDER — GUAIFENESIN AND PSEUDOEPHEDRINE HCL 1200; 120 MG/1; MG/1
1 TABLET, EXTENDED RELEASE ORAL 2 TIMES DAILY
Qty: 20 TABLET | Refills: 0 | Status: SHIPPED | OUTPATIENT
Start: 2025-02-23

## 2025-02-23 RX ORDER — IPRATROPIUM BROMIDE AND ALBUTEROL SULFATE 2.5; .5 MG/3ML; MG/3ML
3 SOLUTION RESPIRATORY (INHALATION) ONCE
Status: COMPLETED | OUTPATIENT
Start: 2025-02-23 | End: 2025-02-23

## 2025-02-23 RX ADMIN — IPRATROPIUM BROMIDE AND ALBUTEROL SULFATE 3 ML: 2.5; .5 SOLUTION RESPIRATORY (INHALATION) at 08:29

## 2025-02-23 RX ADMIN — PREDNISONE 60 MG: 10 TABLET ORAL at 10:13

## 2025-02-23 RX ADMIN — ALBUTEROL SULFATE 2.5 MG: 2.5 SOLUTION RESPIRATORY (INHALATION) at 08:29

## 2025-02-23 ASSESSMENT — PAIN SCALES - GENERAL: PAINLEVEL_OUTOF10: 0 - NO PAIN

## 2025-02-23 ASSESSMENT — COLUMBIA-SUICIDE SEVERITY RATING SCALE - C-SSRS
6. HAVE YOU EVER DONE ANYTHING, STARTED TO DO ANYTHING, OR PREPARED TO DO ANYTHING TO END YOUR LIFE?: NO
1. IN THE PAST MONTH, HAVE YOU WISHED YOU WERE DEAD OR WISHED YOU COULD GO TO SLEEP AND NOT WAKE UP?: NO
2. HAVE YOU ACTUALLY HAD ANY THOUGHTS OF KILLING YOURSELF?: NO

## 2025-02-23 ASSESSMENT — PAIN - FUNCTIONAL ASSESSMENT: PAIN_FUNCTIONAL_ASSESSMENT: 0-10

## 2025-02-23 NOTE — ED TRIAGE NOTES
Pt presenting with worsening s/sx of cough/flu. Pt had sore throat and cough starting last week that has not gotten better. Pt had audible wheezes and reports having increased difficulty of breathing. Pt denies sick contacts. Pt spo2 94% RA and is not in acute distress.

## 2025-02-23 NOTE — ED PROVIDER NOTES
Chief Complaint   Patient presents with   • Cough   • Shortness of Breath       HPI       74 year old male presents to the Emergency Department today complaining of a 1 week history of fatigue, nasal congestion, postnasal drip, sore throat, nonproductive cough, shortness of breath, and nausea with vomiting and diarrhea. Denies any associated fever, chills, headache, neck pain, chest pain, abdominal pain, nausea, vomiting, diarrhea, constipation, or urinary symptoms. No known sick contacts.       History provided by:  Patient             Patient History   Past Medical History:   Diagnosis Date   • Hyperlipidemia    • Hypertension    • Myocardial infarction (Multi)      Past Surgical History:   Procedure Laterality Date   • CHOLECYSTECTOMY  1999     Family History   Problem Relation Name Age of Onset   • Diabetes Mother     • Prostate cancer Father       Social History     Tobacco Use   • Smoking status: Former     Current packs/day: 0.00     Types: Cigarettes     Quit date:      Years since quittin.   • Smokeless tobacco: Never   Vaping Use   • Vaping status: Never Used   Substance Use Topics   • Alcohol use: Not Currently   • Drug use: Never           Physical Exam  Constitutional:       Appearance: Normal appearance.   HENT:      Head: Normocephalic.      Right Ear: Tympanic membrane, ear canal and external ear normal.      Left Ear: Tympanic membrane, ear canal and external ear normal.      Nose: Nose normal.      Mouth/Throat:      Lips: Pink.      Mouth: Mucous membranes are moist.      Dentition: No dental caries.      Pharynx: Oropharynx is clear. Uvula midline. No oropharyngeal exudate or posterior oropharyngeal erythema.      Tonsils: No tonsillar exudate. 1+ on the right. 1+ on the left.   Eyes:      Conjunctiva/sclera: Conjunctivae normal.      Pupils: Pupils are equal, round, and reactive to light.   Cardiovascular:      Rate and Rhythm: Normal rate and regular rhythm.      Heart  sounds: No murmur heard.     No friction rub. No gallop.   Pulmonary:      Effort: Pulmonary effort is normal. No respiratory distress.      Breath sounds: No stridor. Examination of the right-upper field reveals wheezing. Examination of the left-upper field reveals wheezing. Examination of the right-middle field reveals wheezing. Examination of the left-middle field reveals wheezing. Examination of the right-lower field reveals wheezing. Examination of the left-lower field reveals wheezing. Wheezing present. No rhonchi or rales.   Neurological:      Mental Status: He is alert.         Labs Reviewed   INFLUENZA A AND B PCR - Abnormal       Result Value    Flu A Result Detected (*)     Flu B Result Not Detected      Narrative:     This assay is an in vitro diagnostic multiplex nucleic acid amplification test for the detection and discrimination of Influenza A & B from nasopharyngeal specimens, and has been validated for use at [facility]. Negative results do not preclude Influenza A/B infections, and should not be used as the sole basis for diagnosis, treatment, or other management decisions. If Influenza A/B and RSV PCR results are negative, testing for Parainfluenza virus, Adenovirus and Metapneumovirus is routinely performed for [facility] pediatric oncology and intensive care inpatients, and is available on other patients by placing an add-on request.   SARS-COV-2 PCR - Normal    Coronavirus 2019, PCR Not Detected      Narrative:     This assay is an FDA-cleared, in vitro diagnostic nucleic acid amplification test for the qualitative detection and differentiation of SARS CoV-2 from nasopharyngeal specimens collected from individuals with signs and symptoms of respiratory tract infections, and has been validated for use at [facility]. Negative results do not preclude COVID-19 infections and should not be used as the sole basis for diagnosis, treatment, or other management  decisions. Testing for SARS CoV-2 is recommended only for patients who meet current clinical and/or epidemiological criteria defined by federal, state, or local public health directives.   RSV PCR - Normal    RSV PCR Not Detected      Narrative:     This assay is an FDA-cleared, in vitro diagnostic nucleic acid amplification test for the detection of RSV from nasopharyngeal specimens, and has been validated for use at [facility]. Negative results do not preclude RSV infections, and should not be used as the sole basis for diagnosis, treatment, or other management decisions. If Influenza A/B and RSV PCR results are negative, testing for Parainfluenza virus, Adenovirus and Metapneumovirus is routinely performed for pediatric oncology and intensive care inpatients at [facility], and is available on other patients by placing an add-on request.           XR chest 2 views   Final Result   1.  No evidence of acute cardiopulmonary process.                  MACRO:   None        Signed by: Akash Holloway 2/23/2025 9:40 AM   Dictation workstation:   [id number]               ED Course & MDM   Diagnoses as of 02/23/25 1007   Influenza A           Medical Decision Making  Patient was seen and evaluated by myself. Given Albuterol x 2 and Atrovent x 1 aerosols with improvement in his shortness of breath and wheezing upon repeat exam. COVID and RSV were negative. CXR showed no evidence of acute cardiopulmonary process. I independently reviewed the films and concur with radiology interpretation. Tested positive for Influenza A. At this time, I feel that his symptoms are secondary to such.  Take Tylenol and Advil over the counter as needed for fever and/or pain. No contraindications to NSAIDs are noted. Therefore, antibiotics are not clinically indicated. Given prescriptions for Mucinex plus D, Prednisone, and an Albuterol inhaler. Follow up with their doctor in 3 days. Return if worse in any way. Discharged in  stable condition with computer instructions.    Diagnostic Impression:     1. Acute Influenza A    2. Prescription therapy            Your medication list        ASK your doctor about these medications        Instructions Last Dose Given Next Dose Due   aspirin 81 mg EC tablet           atorvastatin 40 mg tablet  Commonly known as: Lipitor      Take 1 tablet (40 mg) by mouth once daily at bedtime.       levothyroxine 112 mcg tablet  Commonly known as: Synthroid, Levoxyl      Take 1 tablet (112 mcg) by mouth once daily.       metoprolol tartrate 50 mg tablet  Commonly known as: Lopressor      Take 1 tablet by mouth 2 times a day.                  Procedure  Procedures     Diony Martin, RAMIN-CNP  02/23/25 1007

## 2025-03-27 LAB
ALBUMIN SERPL-MCNC: 4 G/DL (ref 3.6–5.1)
ALP SERPL-CCNC: 86 U/L (ref 35–144)
ALT SERPL-CCNC: 21 U/L (ref 9–46)
ANION GAP SERPL CALCULATED.4IONS-SCNC: 6 MMOL/L (CALC) (ref 7–17)
AST SERPL-CCNC: 18 U/L (ref 10–35)
BILIRUB SERPL-MCNC: 0.6 MG/DL (ref 0.2–1.2)
BUN SERPL-MCNC: 20 MG/DL (ref 7–25)
CALCIUM SERPL-MCNC: 9.2 MG/DL (ref 8.6–10.3)
CHLORIDE SERPL-SCNC: 103 MMOL/L (ref 98–110)
CHOLEST SERPL-MCNC: 116 MG/DL
CHOLEST/HDLC SERPL: 2.6 (CALC)
CO2 SERPL-SCNC: 29 MMOL/L (ref 20–32)
CREAT SERPL-MCNC: 0.8 MG/DL (ref 0.7–1.28)
EGFRCR SERPLBLD CKD-EPI 2021: 93 ML/MIN/1.73M2
EST. AVERAGE GLUCOSE BLD GHB EST-MCNC: 134 MG/DL
EST. AVERAGE GLUCOSE BLD GHB EST-SCNC: 7.4 MMOL/L
GLUCOSE SERPL-MCNC: 105 MG/DL (ref 65–99)
HBA1C MFR BLD: 6.3 % OF TOTAL HGB
HDLC SERPL-MCNC: 45 MG/DL
LDLC SERPL CALC-MCNC: 56 MG/DL (CALC)
NONHDLC SERPL-MCNC: 71 MG/DL (CALC)
POTASSIUM SERPL-SCNC: 5.1 MMOL/L (ref 3.5–5.3)
PROT SERPL-MCNC: 6.7 G/DL (ref 6.1–8.1)
SODIUM SERPL-SCNC: 138 MMOL/L (ref 135–146)
TRIGL SERPL-MCNC: 69 MG/DL

## 2025-04-02 ENCOUNTER — APPOINTMENT (OUTPATIENT)
Dept: PRIMARY CARE | Facility: CLINIC | Age: 75
End: 2025-04-02
Payer: MEDICARE

## 2025-04-02 VITALS
HEART RATE: 57 BPM | DIASTOLIC BLOOD PRESSURE: 82 MMHG | BODY MASS INDEX: 30.32 KG/M2 | TEMPERATURE: 97.5 F | SYSTOLIC BLOOD PRESSURE: 134 MMHG | WEIGHT: 211.8 LBS | OXYGEN SATURATION: 97 % | HEIGHT: 70 IN

## 2025-04-02 DIAGNOSIS — Z12.11 SCREENING FOR MALIGNANT NEOPLASM OF COLON: ICD-10-CM

## 2025-04-02 DIAGNOSIS — E11.9 TYPE 2 DIABETES MELLITUS WITHOUT COMPLICATION, WITHOUT LONG-TERM CURRENT USE OF INSULIN: ICD-10-CM

## 2025-04-02 DIAGNOSIS — Z00.00 ROUTINE ADULT HEALTH MAINTENANCE: ICD-10-CM

## 2025-04-02 DIAGNOSIS — I25.10 CORONARY ARTERIOSCLEROSIS: ICD-10-CM

## 2025-04-02 DIAGNOSIS — E78.2 MIXED HYPERLIPIDEMIA: ICD-10-CM

## 2025-04-02 DIAGNOSIS — L20.9 ATOPIC DERMATITIS, UNSPECIFIED TYPE: ICD-10-CM

## 2025-04-02 DIAGNOSIS — E03.9 HYPOTHYROIDISM, UNSPECIFIED TYPE: ICD-10-CM

## 2025-04-02 DIAGNOSIS — Z00.00 MEDICARE ANNUAL WELLNESS VISIT, SUBSEQUENT: ICD-10-CM

## 2025-04-02 PROCEDURE — 99397 PER PM REEVAL EST PAT 65+ YR: CPT | Performed by: FAMILY MEDICINE

## 2025-04-02 PROCEDURE — 1123F ACP DISCUSS/DSCN MKR DOCD: CPT | Performed by: FAMILY MEDICINE

## 2025-04-02 PROCEDURE — 1159F MED LIST DOCD IN RCRD: CPT | Performed by: FAMILY MEDICINE

## 2025-04-02 PROCEDURE — G0439 PPPS, SUBSEQ VISIT: HCPCS | Performed by: FAMILY MEDICINE

## 2025-04-02 PROCEDURE — 1036F TOBACCO NON-USER: CPT | Performed by: FAMILY MEDICINE

## 2025-04-02 PROCEDURE — 3008F BODY MASS INDEX DOCD: CPT | Performed by: FAMILY MEDICINE

## 2025-04-02 PROCEDURE — 3075F SYST BP GE 130 - 139MM HG: CPT | Performed by: FAMILY MEDICINE

## 2025-04-02 PROCEDURE — 99214 OFFICE O/P EST MOD 30 MIN: CPT | Performed by: FAMILY MEDICINE

## 2025-04-02 PROCEDURE — 3079F DIAST BP 80-89 MM HG: CPT | Performed by: FAMILY MEDICINE

## 2025-04-02 PROCEDURE — 1170F FXNL STATUS ASSESSED: CPT | Performed by: FAMILY MEDICINE

## 2025-04-02 RX ORDER — LEVOTHYROXINE SODIUM 112 UG/1
112 TABLET ORAL DAILY
Qty: 90 TABLET | Refills: 3 | Status: SHIPPED | OUTPATIENT
Start: 2025-04-02 | End: 2026-04-02

## 2025-04-02 RX ORDER — TRIAMCINOLONE ACETONIDE 1 MG/G
CREAM TOPICAL 2 TIMES DAILY
Qty: 80 G | Refills: 1 | Status: SHIPPED | OUTPATIENT
Start: 2025-04-02 | End: 2025-04-02 | Stop reason: ALTCHOICE

## 2025-04-02 RX ORDER — CLOBETASOL PROPIONATE 0.5 MG/G
CREAM TOPICAL DAILY PRN
Qty: 30 G | Refills: 2 | Status: SHIPPED | OUTPATIENT
Start: 2025-04-02 | End: 2025-11-28

## 2025-04-02 RX ORDER — TRIAMCINOLONE ACETONIDE 1 MG/G
CREAM TOPICAL 2 TIMES DAILY
Qty: 80 G | Refills: 1 | Status: SHIPPED | OUTPATIENT
Start: 2025-04-02 | End: 2025-04-02

## 2025-04-02 ASSESSMENT — ACTIVITIES OF DAILY LIVING (ADL)
DOING_HOUSEWORK: INDEPENDENT
BATHING: INDEPENDENT
TAKING_MEDICATION: INDEPENDENT
MANAGING_FINANCES: INDEPENDENT
DRESSING: INDEPENDENT
GROCERY_SHOPPING: INDEPENDENT

## 2025-04-02 NOTE — PROGRESS NOTES
"Subjective   Reason for Visit: Heri Beasley is an 74 y.o. male here for a Medicare Wellness visit.          Reviewed all medications by prescribing practitioner or clinical pharmacist (such as prescriptions, OTCs, herbal therapies and supplements) and documented in the medical record.    HPI:     1. COPD: stable.  Recently s/p flu.    2. DM2: a1c was 6.3.      3. hypothyroid: stable      Preparing meals - independent  Using telephone - independent  Bladder - continent  Bowel - continent    Recent hospital stays - none    ADLs - able to dress, walk and bath independently  Instrumental ADL's - able to shop, maintain finances, managing medications, housekeeping independently    Depression: PHQ-2 (screening 2 mins) - negative    Opioid use -   none  Exercise -  tries to stay active  Diet - well balanced  Pain score -    Education - educated pt on healthy eating, exercise, weight loss    Falls -no falls      MiniCO/5      Counseled pt on living will - pt has one    AAA screening: negative in      Prostate CA screen: declines    CV screening: checked and good    Colonoscopy: needs a repeat.  Ordered.    Diabetes screening:  checked this time, a1c was 6.3.    Glaucoma screen:  encouraged to see optho    Vaccines:  Had the J&J vaccine.  didn't get flu or PNA shot.  Had PNA shot once before.      Patient Care Team:  Fred Ramon MD as PCP - General             Review of Systems   All other systems reviewed and are negative.      Objective   Vitals:  /82   Pulse 57   Temp 36.4 °C (97.5 °F)   Ht 1.778 m (5' 10\")   Wt 96.1 kg (211 lb 12.8 oz)   SpO2 97%   BMI 30.39 kg/m²       Physical Exam  Vitals reviewed.   Constitutional:       General: He is not in acute distress.     Appearance: Normal appearance. He is well-developed. He is not diaphoretic.   HENT:      Head: Normocephalic and atraumatic.      Right Ear: Tympanic membrane normal.      Left Ear: Tympanic membrane normal.      Nose: Nose " normal.      Mouth/Throat:      Mouth: Mucous membranes are moist.   Eyes:      Pupils: Pupils are equal, round, and reactive to light.   Cardiovascular:      Rate and Rhythm: Normal rate and regular rhythm.      Heart sounds: Normal heart sounds. No murmur heard.     No friction rub. No gallop.   Pulmonary:      Effort: Pulmonary effort is normal.      Breath sounds: Normal breath sounds. No rales.   Abdominal:      General: Bowel sounds are normal.      Palpations: Abdomen is soft.      Tenderness: There is no abdominal tenderness.   Musculoskeletal:      Cervical back: Normal range of motion and neck supple.   Skin:     General: Skin is warm and dry.   Neurological:      Mental Status: He is alert.   Psychiatric:         Mood and Affect: Mood normal.         Assessment/Plan   Problem List Items Addressed This Visit       Coronary arteriosclerosis    Overview     Note: fe         Relevant Orders    Comprehensive metabolic panel    Lipid panel    Hemoglobin A1c    Albumin-Creatinine Ratio, Urine Random    Hypothyroidism    Overview     Note: aa         Relevant Medications    levothyroxine (Synthroid, Levoxyl) 112 mcg tablet    Other Relevant Orders    Comprehensive metabolic panel    Lipid panel    Hemoglobin A1c    Albumin-Creatinine Ratio, Urine Random    Type 2 diabetes mellitus    Relevant Orders    Comprehensive metabolic panel    Lipid panel    Hemoglobin A1c    Albumin-Creatinine Ratio, Urine Random     Other Visit Diagnoses       Atopic dermatitis, unspecified type    -  Primary    Relevant Medications    triamcinolone (Kenalog) 0.1 % cream    Other Relevant Orders    Comprehensive metabolic panel    Lipid panel    Hemoglobin A1c    Albumin-Creatinine Ratio, Urine Random    Screening for malignant neoplasm of colon        Relevant Orders    Colonoscopy Screening; Average Risk Patient    Comprehensive metabolic panel    Lipid panel    Hemoglobin A1c    Albumin-Creatinine Ratio, Urine Random          Patient  is doing well.  Refilled pts meds.  Follow up in 6 mo.  Apply triamcinolone cream to ears.

## 2025-04-03 ENCOUNTER — TELEPHONE (OUTPATIENT)
Facility: CLINIC | Age: 75
End: 2025-04-03
Payer: MEDICARE

## 2025-04-03 NOTE — TELEPHONE ENCOUNTER
[facility] OPEN ACCESS COLONOSCOPY SCREENING FORM       Last Colonoscopy? Where: [facility]  When: 2024  ANESTHESIA SCREENING   1. Height 5'10     Weight 211  BMI 30.39    (Clinic Visit if BMI over 42)   2. Are you on any blood thinning medications including  mg? yes ( Clinic visit if yes)  Name of blood thinning medication: ASA 325mg  3. Are you on oxygen at home? no (Clinic visit if yes)   4. Have you seen your primary care provider within the last 12 months? 4.2.2025 (Clinic visit if NO)   5. History of:   Pacemaker/Defibrillator no                          Heart Failure no                         Heart Disease silent heart attack 24 years ago stent placed                          Stroke no   Details of cardiac history: HTN  (Clinic visit if history of heart failure or new diagnosis/new intervention in last year)     6. Do you have renal failure or require dialysis? no  7. Do you have sleep Apnea? no  8. Are you on insulin for diabetes? no If yes, patient should discuss genaro-procedural medication adjustment with PCP.   9. Are you currently on GLP-1 or SGLT2 inhibitors? no  10. Do you have a history of seizures? no (If YES- indicate recent or NOT recent. Anesthesia to review if recent.)  11.) Do you have a history of Drug/Alcohol Abuse? no  (If YES- indicate how recent. If within the last year Patient needs to be seen in the office)    GI SCREENING   Have you had a positive stool based screening test? (i.e. fecal occult, FIT, or Cologuard) no   ? If yes and pass anesthesia screen, no further questions are needed. Schedule OA procedure.   ? If yes and they do NOT pass anesthesia screen then refer to office for expedited review/visit.   ? If no, proceed to the following GI screening questions to determine if office visit is needed.      If patient answers YES to any of the following please send to the office for an appointment.  1. Do you have chronic diarrhea lasting longer than 3 weeks? no    2. Do you have a large amount of rectal bleeding or frequent rectal bleeding? no  3. Do you have significant, unexplained weight loss? no    Office visit required yes    Patient is scheduled with Dr. Drummond for 5.28.2025 for a Office visit

## 2025-04-11 ENCOUNTER — APPOINTMENT (OUTPATIENT)
Facility: CLINIC | Age: 75
End: 2025-04-11
Payer: MEDICARE

## 2025-04-11 VITALS
SYSTOLIC BLOOD PRESSURE: 162 MMHG | OXYGEN SATURATION: 96 % | DIASTOLIC BLOOD PRESSURE: 82 MMHG | HEIGHT: 70 IN | WEIGHT: 208 LBS | HEART RATE: 58 BPM | BODY MASS INDEX: 29.78 KG/M2

## 2025-04-11 DIAGNOSIS — Z12.11 SCREENING FOR COLON CANCER: Primary | ICD-10-CM

## 2025-04-11 PROCEDURE — 3077F SYST BP >= 140 MM HG: CPT | Performed by: INTERNAL MEDICINE

## 2025-04-11 PROCEDURE — 1123F ACP DISCUSS/DSCN MKR DOCD: CPT | Performed by: INTERNAL MEDICINE

## 2025-04-11 PROCEDURE — 3008F BODY MASS INDEX DOCD: CPT | Performed by: INTERNAL MEDICINE

## 2025-04-11 PROCEDURE — 99213 OFFICE O/P EST LOW 20 MIN: CPT | Performed by: INTERNAL MEDICINE

## 2025-04-11 PROCEDURE — 1160F RVW MEDS BY RX/DR IN RCRD: CPT | Performed by: INTERNAL MEDICINE

## 2025-04-11 PROCEDURE — 1036F TOBACCO NON-USER: CPT | Performed by: INTERNAL MEDICINE

## 2025-04-11 PROCEDURE — 1159F MED LIST DOCD IN RCRD: CPT | Performed by: INTERNAL MEDICINE

## 2025-04-11 PROCEDURE — 3079F DIAST BP 80-89 MM HG: CPT | Performed by: INTERNAL MEDICINE

## 2025-04-11 RX ORDER — POLYETHYLENE GLYCOL 3350, SODIUM SULFATE ANHYDROUS, SODIUM BICARBONATE, SODIUM CHLORIDE, POTASSIUM CHLORIDE 236; 22.74; 6.74; 5.86; 2.97 G/4L; G/4L; G/4L; G/4L; G/4L
4000 POWDER, FOR SOLUTION ORAL ONCE
Qty: 4000 ML | Refills: 0 | Status: SHIPPED | OUTPATIENT
Start: 2025-04-11 | End: 2025-04-11

## 2025-04-11 RX ORDER — ASPIRIN 325 MG
325 TABLET ORAL DAILY
COMMUNITY

## 2025-04-11 ASSESSMENT — ENCOUNTER SYMPTOMS
SHORTNESS OF BREATH: 0
VOICE CHANGE: 0
WHEEZING: 0
NUMBNESS: 0
HEMATURIA: 0
SORE THROAT: 0
HEADACHES: 0
DIZZINESS: 0
ROS GI COMMENTS: AS DETAILED ABOVE.
COUGH: 0
NERVOUS/ANXIOUS: 0
FATIGUE: 0
BRUISES/BLEEDS EASILY: 0
MYALGIAS: 0
WEAKNESS: 0
PALPITATIONS: 0
ADENOPATHY: 0
SEIZURES: 0
ARTHRALGIAS: 0
FEVER: 0
DYSURIA: 0
UNEXPECTED WEIGHT CHANGE: 0
CHILLS: 0
CONFUSION: 0
TROUBLE SWALLOWING: 0

## 2025-04-11 NOTE — PROGRESS NOTES
[facility] Gastroenterology    ASSESSMENT and PLAN:       Heri Beasley is a 74 y.o. male with a significant past medical history of COPD, CAD, HTN, DM2, hypothyroidism, and HLD who presents for consultation requested by his primary care provider (Fred Ramon MD) for a discussion regarding colonoscopy.       Colon Polyps  History of polyps on last colonoscopy and due for surveillance given the inadequate prep with his last colonoscopy. Asymptomatic at this time.  No previous difficulties with sedation. He is on full dose ASA (325 mg daily) and he will need to decrease the dose of that to 81 mg daily for 10 days prior to his procedure. After his procedure he should be able to resume the full dose. Ultimately would defer to his PCP to determine which dose of ASA is most appropriate for him long term.  - colonoscopy scheduled in endo  - decrease dose of ASA to 81 mg daily for 10 days prior to procedure  - Golytely bowel prep discussed with patient and Rx sent to pharmacy  - instructions for procedure discussed with patient in the office today and hard copy given to patient today      Follow up with GI as needed.        Bryan Drummond MD        Senior Attending Physician, Gastroenterology    [facility]    Clinical   [employer] [facility] [employer]        Subjective   HISTORY OF PRESENT ILLNESS:     Chief Complaint  Colonoscopy    History Of Present Illness:    Heri Beasley is a 74 y.o. male with a significant past medical history of COPD, CAD, HTN, DM2, hypothyroidism, and HLD who presents for consultation requested by his primary care provider (Fred Ramon MD) for a discussion regarding colonoscopy.     His medication list includes ASA (81 mg daily), but he has reported that he actually takes full dose (325 mg) aspirin daily.    I had previously seen him in 2024 after his PCP had ordered a  surveillance colonoscopy. Colonoscopy was done (detailed below), but was limited by prep quality.      Today he says that he is doing well and still has not had any GI symptoms. He still has some asymptomatic constipation with a BM every 3-4 days. He says that he finished the whole prep last time, but felt that the Golytely that he did for a previous colonoscopy worked better. He denies any difficulties with sedation/anesthesia in the past. He continues to take ASA (325 mg daily).      Patient denies any heartburn/GERD, N/V, dysphagia, odynophagia, abdominal pain, diarrhea, hematemesis, hematochezia, melena, or weight loss.    Endoscopy History:  7/31/2019 - Colonoscopy: three sigmoid polyps (TA and hyperplastic on path), diverticulosis   6/12/2024 - Colonoscopy: 7 mm sigmoid polyp (TA on path), diverticulosis, inadequate prep      Review of systems:   Review of Systems   Constitutional:  Negative for chills, fatigue, fever and unexpected weight change.   HENT:  Negative for congestion, sneezing, sore throat, trouble swallowing and voice change.    Respiratory:  Negative for cough, shortness of breath and wheezing.    Cardiovascular:  Negative for chest pain, palpitations and leg swelling.   Gastrointestinal:         As detailed above.   Genitourinary:  Negative for dysuria and hematuria.   Musculoskeletal:  Negative for arthralgias and myalgias.   Skin:  Negative for pallor and rash.   Neurological:  Negative for dizziness, seizures, syncope, weakness, numbness and headaches.   Hematological:  Negative for adenopathy. Does not bruise/bleed easily.   Psychiatric/Behavioral:  Negative for confusion. The patient is not nervous/anxious.    All other systems reviewed and are negative.      I performed a complete 10 point review of systems and it is negative except as noted in HPI or above.      PAST HISTORIES:       Past Medical History:  Patient Active Problem List   Diagnosis    Coronary arteriosclerosis    Essential  "hypertension    Mixed hyperlipidemia    Hypothyroidism    Moderate chronic obstructive pulmonary disease (Multi)    Type 2 diabetes mellitus     He has a past medical history of Hyperlipidemia, Hypertension, and Myocardial infarction (Multi) (1999).    Past Surgical History:  He has a past surgical history that includes Cholecystectomy (Feb. 1999).      Social History:  He reports that he quit smoking about 26 years ago. His smoking use included cigarettes. He has never used smokeless tobacco. He reports that he does not currently use alcohol. He reports that he does not use drugs.    Family History:  No known family history of GI disease, specifically denies any family history of pancreatitis, Crohn's, colon cancer, gastroesophageal cancer, or ulcerative colitis.    Family History   Problem Relation Name Age of Onset    Diabetes Mother      Prostate cancer Father          Allergies:  Lisinopril      Objective   OBJECTIVE:       Last Recorded Vitals:  Vitals:    04/11/25 1017   BP: 162/82   Pulse: 58   SpO2: 96%   Weight: 94.3 kg (208 lb)   Height: 1.778 m (5' 10\")     /82   Pulse 58   Ht 1.778 m (5' 10\")   Wt 94.3 kg (208 lb)   SpO2 96%   BMI 29.84 kg/m²      Physical Exam:    Physical Exam  Vitals reviewed.   Constitutional:       General: He is not in acute distress.     Appearance: He is not ill-appearing.   HENT:      Head: Normocephalic and atraumatic.   Eyes:      General: No scleral icterus.  Cardiovascular:      Rate and Rhythm: Normal rate and regular rhythm.      Pulses: Normal pulses.      Heart sounds: Normal heart sounds. No murmur heard.  Pulmonary:      Effort: Pulmonary effort is normal. No respiratory distress.      Breath sounds: Normal breath sounds. No wheezing.   Abdominal:      General: Bowel sounds are normal.      Palpations: Abdomen is soft.      Tenderness: There is no abdominal tenderness. There is no rebound.   Musculoskeletal:         General: No swelling or deformity. "   Skin:     General: Skin is warm and dry.      Coloration: Skin is not jaundiced.      Findings: No rash.   Neurological:      General: No focal deficit present.      Mental Status: He is alert and oriented to person, place, and time.   Psychiatric:         Mood and Affect: Mood normal.         Behavior: Behavior normal.         Thought Content: Thought content normal.         Judgment: Judgment normal.         Home Medications:  Prior to Admission medications    Medication Sig Start Date End Date Taking? Authorizing Provider   albuterol 90 mcg/actuation inhaler Inhale 2 puffs every 4 hours if needed for wheezing or shortness of breath. 2/23/25   Diony Martin, APRN-CNP   aspirin 81 mg EC tablet     Historical Provider, MD   atorvastatin (Lipitor) 40 mg tablet Take 1 tablet (40 mg) by mouth once daily at bedtime. 10/2/24 10/2/25  Fred Ramon MD   clobetasol (Temovate) 0.05 % cream Apply topically once daily as needed (dermatitis). Use a tiny amount on ears 4/2/25 11/28/25  Fred Ramon MD   levothyroxine (Synthroid, Levoxyl) 112 mcg tablet Take 1 tablet (112 mcg) by mouth once daily. 4/2/25 4/2/26  Fred Ramon MD   metoprolol tartrate (Lopressor) 50 mg tablet Take 1 tablet by mouth 2 times a day. 10/2/24 10/2/25  Fred Ramon MD         Relevant Results Recent labs reviewed in the EMR.    Lab Results   Component Value Date/Time    WBC 8.8 07/03/2024 1119    HGB 14.9 07/03/2024 1119    HGB 15.7 04/09/2020 2105    HGB 14.5 01/28/2020 0945    MCV 88 07/03/2024 1119     07/03/2024 1119     04/09/2020 2105     01/28/2020 0945       Lab Results   Component Value Date/Time     03/26/2025 0712    K 5.1 03/26/2025 0712     03/26/2025 0712    BUN 20 03/26/2025 0712    CREATININE 0.80 03/26/2025 0712    CREATININE 0.77 09/24/2024 0803    CREATININE 1.12 07/03/2024 1119       Lab Results   Component Value Date/Time    BILITOT 0.6 03/26/2025 0712    BILITOT 0.7 09/24/2024 0803  "   BILITOT 0.7 03/25/2024 0752    BILITOT 1.0 09/27/2023 0822    BILITOT 0.7 03/21/2023 0801    BILITOT 0.8 09/14/2022 0930    BILIDIR 0.2 04/10/2020 0305    ALKPHOS 86 03/26/2025 0712    ALKPHOS 101 09/24/2024 0803    ALKPHOS 82 03/25/2024 0752    ALKPHOS 79 09/27/2023 0822    ALKPHOS 95 03/21/2023 0801    ALKPHOS 81 09/14/2022 0930    AST 18 03/26/2025 0712    AST 14 09/24/2024 0803    AST 12 03/25/2024 0752    AST 15 09/27/2023 0822    AST 14 03/21/2023 0801    AST 13 09/14/2022 0930    ALT 21 03/26/2025 0712    ALT 17 09/24/2024 0803    ALT 16 03/25/2024 0752    ALT 19 09/27/2023 0822    ALT 15 03/21/2023 0801    ALT 14 09/14/2022 0930       No results found for: \"CRP\", \"CALPS\"    Radiology: Imaging reviewed in the EMR.    === 07/03/24 ===    CT ABDOMEN PELVIS WO IV CONTRAST    - Impression -  1.  2 mm obstructing left proximal ureteral stone with mild upstream  hydronephrosis.    2. Cholecystectomy changes and hepatic steatosis.    3. Robust vascular calcification. There does appear to be some soft  tissue seen surrounding the aorta. This does appear to be somewhat  chronic as it extends laterally and posterior to the aorta. Could be  some component of adenopathy or retroperitoneal fibrosis or scar.  Query prior surgical intervention. Without such history, MRI is  advised to better evaluate etiology of this finding    Enlarged prostate gland    MACRO:  None    Signed by: Cosme Smith 7/3/2024 12:38 PM  Dictation workstation:   [id number]      "

## 2025-04-11 NOTE — PATIENT INSTRUCTIONS
You have been scheduled for a colonoscopy.  You were given instructions for preparing for this test in the office today.  If you have questions about these instructions, please call my office at [phone].      Today you stated that you are taking full dose (325 mg) Aspirin every day. You will need to decrease the dose of that to low dose (81 mg) Aspirin for 10 days before your procedure. You will likely be able to increase the dose again the day after your procedure.      After your procedure, you can expect me to talk to you to go over the results of the procedure. If polyps were removed I will usually be able to tell you my initial thoughts about those polyps and give you some idea of when you should have another colonoscopy.    If any polyps are removed during your procedure or if any biopsies are obtained those specimens will go to the pathologists to review under the microscope. Once those results are available they will be sent to me electronically to review. These results will also be available to you at that time through the patient portal. I briefly review all of these results by the next business day to determine if there is any urgent information that needs to be communicated to you right away or anything that would significantly change what I told you at the time of the procedure. If there is nothing urgent this is usually a good sign and I will then do a more extensive review of the result and send you a letter with my final recommendations within a week of the result becoming available. If you have questions or need additional information I urge you to call the office at [phone], but I do ask for patience as I spend a good portion of each day performing procedures like the one you are scheduled for and during those times I am not able to take or return routine phone calls.    You were also given information regarding the schedule for your procedure including the time that you need to  arrive to the endoscopy unit.  You will also be contacted 2-3 day prior to your procedure to confirm the final arrival time.  If you have questions about this or if you need to cancel or change this appointment please call my office at [phone].       Follow up with GI as needed.

## 2025-04-11 NOTE — H&P (VIEW-ONLY)
[facility] Gastroenterology    ASSESSMENT and PLAN:       Heri Beasley is a 74 y.o. male with a significant past medical history of COPD, CAD, HTN, DM2, hypothyroidism, and HLD who presents for consultation requested by his primary care provider (Fred Ramon MD) for a discussion regarding colonoscopy.       Colon Polyps  History of polyps on last colonoscopy and due for surveillance given the inadequate prep with his last colonoscopy. Asymptomatic at this time.  No previous difficulties with sedation. He is on full dose ASA (325 mg daily) and he will need to decrease the dose of that to 81 mg daily for 10 days prior to his procedure. After his procedure he should be able to resume the full dose. Ultimately would defer to his PCP to determine which dose of ASA is most appropriate for him long term.  - colonoscopy scheduled in endo  - decrease dose of ASA to 81 mg daily for 10 days prior to procedure  - Golytely bowel prep discussed with patient and Rx sent to pharmacy  - instructions for procedure discussed with patient in the office today and hard copy given to patient today      Follow up with GI as needed.        Bryan Drummond MD        Senior Attending Physician, Gastroenterology    [facility]    Clinical   [facility] [employer]        Subjective   HISTORY OF PRESENT ILLNESS:     Chief Complaint  Colonoscopy    History Of Present Illness:    Heri Beasley is a 74 y.o. male with a significant past medical history of COPD, CAD, HTN, DM2, hypothyroidism, and HLD who presents for consultation requested by his primary care provider (Fred Ramon MD) for a discussion regarding colonoscopy.     His medication list includes ASA (81 mg daily), but he has reported that he actually takes full dose (325 mg) aspirin daily.    I had previously seen him in 2024 after his PCP had ordered a  surveillance colonoscopy. Colonoscopy was done (detailed below), but was limited by prep quality.      Today he says that he is doing well and still has not had any GI symptoms. He still has some asymptomatic constipation with a BM every 3-4 days. He says that he finished the whole prep last time, but felt that the Golytely that he did for a previous colonoscopy worked better. He denies any difficulties with sedation/anesthesia in the past. He continues to take ASA (325 mg daily).      Patient denies any heartburn/GERD, N/V, dysphagia, odynophagia, abdominal pain, diarrhea, hematemesis, hematochezia, melena, or weight loss.    Endoscopy History:  7/31/2019 - Colonoscopy: three sigmoid polyps (TA and hyperplastic on path), diverticulosis   6/12/2024 - Colonoscopy: 7 mm sigmoid polyp (TA on path), diverticulosis, inadequate prep      Review of systems:   Review of Systems   Constitutional:  Negative for chills, fatigue, fever and unexpected weight change.   HENT:  Negative for congestion, sneezing, sore throat, trouble swallowing and voice change.    Respiratory:  Negative for cough, shortness of breath and wheezing.    Cardiovascular:  Negative for chest pain, palpitations and leg swelling.   Gastrointestinal:         As detailed above.   Genitourinary:  Negative for dysuria and hematuria.   Musculoskeletal:  Negative for arthralgias and myalgias.   Skin:  Negative for pallor and rash.   Neurological:  Negative for dizziness, seizures, syncope, weakness, numbness and headaches.   Hematological:  Negative for adenopathy. Does not bruise/bleed easily.   Psychiatric/Behavioral:  Negative for confusion. The patient is not nervous/anxious.    All other systems reviewed and are negative.      I performed a complete 10 point review of systems and it is negative except as noted in HPI or above.      PAST HISTORIES:       Past Medical History:  Patient Active Problem List   Diagnosis    Coronary arteriosclerosis    Essential  "hypertension    Mixed hyperlipidemia    Hypothyroidism    Moderate chronic obstructive pulmonary disease (Multi)    Type 2 diabetes mellitus     He has a past medical history of Hyperlipidemia, Hypertension, and Myocardial infarction (Multi) (1999).    Past Surgical History:  He has a past surgical history that includes Cholecystectomy (Feb. 1999).      Social History:  He reports that he quit smoking about 26 years ago. His smoking use included cigarettes. He has never used smokeless tobacco. He reports that he does not currently use alcohol. He reports that he does not use drugs.    Family History:  No known family history of GI disease, specifically denies any family history of pancreatitis, Crohn's, colon cancer, gastroesophageal cancer, or ulcerative colitis.    Family History   Problem Relation Name Age of Onset    Diabetes Mother      Prostate cancer Father          Allergies:  Lisinopril      Objective   OBJECTIVE:       Last Recorded Vitals:  Vitals:    04/11/25 1017   BP: 162/82   Pulse: 58   SpO2: 96%   Weight: 94.3 kg (208 lb)   Height: 1.778 m (5' 10\")     /82   Pulse 58   Ht 1.778 m (5' 10\")   Wt 94.3 kg (208 lb)   SpO2 96%   BMI 29.84 kg/m²      Physical Exam:    Physical Exam  Vitals reviewed.   Constitutional:       General: He is not in acute distress.     Appearance: He is not ill-appearing.   HENT:      Head: Normocephalic and atraumatic.   Eyes:      General: No scleral icterus.  Cardiovascular:      Rate and Rhythm: Normal rate and regular rhythm.      Pulses: Normal pulses.      Heart sounds: Normal heart sounds. No murmur heard.  Pulmonary:      Effort: Pulmonary effort is normal. No respiratory distress.      Breath sounds: Normal breath sounds. No wheezing.   Abdominal:      General: Bowel sounds are normal.      Palpations: Abdomen is soft.      Tenderness: There is no abdominal tenderness. There is no rebound.   Musculoskeletal:         General: No swelling or deformity. "   Skin:     General: Skin is warm and dry.      Coloration: Skin is not jaundiced.      Findings: No rash.   Neurological:      General: No focal deficit present.      Mental Status: He is alert and oriented to person, place, and time.   Psychiatric:         Mood and Affect: Mood normal.         Behavior: Behavior normal.         Thought Content: Thought content normal.         Judgment: Judgment normal.         Home Medications:  Prior to Admission medications    Medication Sig Start Date End Date Taking? Authorizing Provider   albuterol 90 mcg/actuation inhaler Inhale 2 puffs every 4 hours if needed for wheezing or shortness of breath. 2/23/25   Diony Martin, APRN-CNP   aspirin 81 mg EC tablet     Historical Provider, MD   atorvastatin (Lipitor) 40 mg tablet Take 1 tablet (40 mg) by mouth once daily at bedtime. 10/2/24 10/2/25  Fred Ramon MD   clobetasol (Temovate) 0.05 % cream Apply topically once daily as needed (dermatitis). Use a tiny amount on ears 4/2/25 11/28/25  Fred Ramon MD   levothyroxine (Synthroid, Levoxyl) 112 mcg tablet Take 1 tablet (112 mcg) by mouth once daily. 4/2/25 4/2/26  Fred Ramon MD   metoprolol tartrate (Lopressor) 50 mg tablet Take 1 tablet by mouth 2 times a day. 10/2/24 10/2/25  Fred Ramon MD         Relevant Results Recent labs reviewed in the EMR.    Lab Results   Component Value Date/Time    WBC 8.8 07/03/2024 1119    HGB 14.9 07/03/2024 1119    HGB 15.7 04/09/2020 2105    HGB 14.5 01/28/2020 0945    MCV 88 07/03/2024 1119     07/03/2024 1119     04/09/2020 2105     01/28/2020 0945       Lab Results   Component Value Date/Time     03/26/2025 0712    K 5.1 03/26/2025 0712     03/26/2025 0712    BUN 20 03/26/2025 0712    CREATININE 0.80 03/26/2025 0712    CREATININE 0.77 09/24/2024 0803    CREATININE 1.12 07/03/2024 1119       Lab Results   Component Value Date/Time    BILITOT 0.6 03/26/2025 0712    BILITOT 0.7 09/24/2024 0803  "   BILITOT 0.7 03/25/2024 0752    BILITOT 1.0 09/27/2023 0822    BILITOT 0.7 03/21/2023 0801    BILITOT 0.8 09/14/2022 0930    BILIDIR 0.2 04/10/2020 0305    ALKPHOS 86 03/26/2025 0712    ALKPHOS 101 09/24/2024 0803    ALKPHOS 82 03/25/2024 0752    ALKPHOS 79 09/27/2023 0822    ALKPHOS 95 03/21/2023 0801    ALKPHOS 81 09/14/2022 0930    AST 18 03/26/2025 0712    AST 14 09/24/2024 0803    AST 12 03/25/2024 0752    AST 15 09/27/2023 0822    AST 14 03/21/2023 0801    AST 13 09/14/2022 0930    ALT 21 03/26/2025 0712    ALT 17 09/24/2024 0803    ALT 16 03/25/2024 0752    ALT 19 09/27/2023 0822    ALT 15 03/21/2023 0801    ALT 14 09/14/2022 0930       No results found for: \"CRP\", \"CALPS\"    Radiology: Imaging reviewed in the EMR.    === 07/03/24 ===    CT ABDOMEN PELVIS WO IV CONTRAST    - Impression -  1.  2 mm obstructing left proximal ureteral stone with mild upstream  hydronephrosis.    2. Cholecystectomy changes and hepatic steatosis.    3. Robust vascular calcification. There does appear to be some soft  tissue seen surrounding the aorta. This does appear to be somewhat  chronic as it extends laterally and posterior to the aorta. Could be  some component of adenopathy or retroperitoneal fibrosis or scar.  Query prior surgical intervention. Without such history, MRI is  advised to better evaluate etiology of this finding    Enlarged prostate gland    MACRO:  None    Signed by: Cosme Smith 7/3/2024 12:38 PM  Dictation workstation:   [id number]      "

## 2025-04-28 ENCOUNTER — PREP FOR PROCEDURE (OUTPATIENT)
Facility: CLINIC | Age: 75
End: 2025-04-28
Payer: MEDICARE

## 2025-04-30 ENCOUNTER — HOSPITAL ENCOUNTER (OUTPATIENT)
Dept: GASTROENTEROLOGY | Facility: HOSPITAL | Age: 75
Discharge: HOME | End: 2025-04-30
Payer: MEDICARE

## 2025-04-30 ENCOUNTER — ANESTHESIA (OUTPATIENT)
Dept: GASTROENTEROLOGY | Facility: HOSPITAL | Age: 75
End: 2025-04-30
Payer: MEDICARE

## 2025-04-30 ENCOUNTER — ANESTHESIA EVENT (OUTPATIENT)
Dept: GASTROENTEROLOGY | Facility: HOSPITAL | Age: 75
End: 2025-04-30
Payer: MEDICARE

## 2025-04-30 VITALS
DIASTOLIC BLOOD PRESSURE: 72 MMHG | SYSTOLIC BLOOD PRESSURE: 118 MMHG | RESPIRATION RATE: 16 BRPM | WEIGHT: 211 LBS | TEMPERATURE: 97 F | HEART RATE: 60 BPM | HEIGHT: 70 IN | OXYGEN SATURATION: 98 % | BODY MASS INDEX: 30.21 KG/M2

## 2025-04-30 DIAGNOSIS — Z12.11 SCREENING FOR MALIGNANT NEOPLASM OF COLON: ICD-10-CM

## 2025-04-30 PROBLEM — R07.9 CHEST PAIN: Status: ACTIVE | Noted: 2025-04-30

## 2025-04-30 PROBLEM — Z95.5 STENTED CORONARY ARTERY: Status: ACTIVE | Noted: 2025-04-30

## 2025-04-30 PROCEDURE — 7100000009 HC PHASE TWO TIME - INITIAL BASE CHARGE

## 2025-04-30 PROCEDURE — 3700000002 HC GENERAL ANESTHESIA TIME - EACH INCREMENTAL 1 MINUTE

## 2025-04-30 PROCEDURE — 7100000010 HC PHASE TWO TIME - EACH INCREMENTAL 1 MINUTE

## 2025-04-30 PROCEDURE — 2500000004 HC RX 250 GENERAL PHARMACY W/ HCPCS (ALT 636 FOR OP/ED): Mod: JZ | Performed by: NURSE ANESTHETIST, CERTIFIED REGISTERED

## 2025-04-30 PROCEDURE — 45385 COLONOSCOPY W/LESION REMOVAL: CPT | Performed by: INTERNAL MEDICINE

## 2025-04-30 PROCEDURE — 3700000001 HC GENERAL ANESTHESIA TIME - INITIAL BASE CHARGE

## 2025-04-30 RX ORDER — FENTANYL CITRATE 50 UG/ML
INJECTION, SOLUTION INTRAMUSCULAR; INTRAVENOUS AS NEEDED
Status: DISCONTINUED | OUTPATIENT
Start: 2025-04-30 | End: 2025-04-30

## 2025-04-30 RX ORDER — SODIUM CHLORIDE 9 MG/ML
100 INJECTION, SOLUTION INTRAVENOUS CONTINUOUS
Status: DISCONTINUED | OUTPATIENT
Start: 2025-04-30 | End: 2025-05-01 | Stop reason: HOSPADM

## 2025-04-30 RX ORDER — LIDOCAINE HYDROCHLORIDE 20 MG/ML
INJECTION, SOLUTION INFILTRATION; PERINEURAL AS NEEDED
Status: DISCONTINUED | OUTPATIENT
Start: 2025-04-30 | End: 2025-04-30

## 2025-04-30 RX ORDER — PROPOFOL 10 MG/ML
INJECTION, EMULSION INTRAVENOUS AS NEEDED
Status: DISCONTINUED | OUTPATIENT
Start: 2025-04-30 | End: 2025-04-30

## 2025-04-30 RX ORDER — SODIUM CHLORIDE 9 MG/ML
INJECTION, SOLUTION INTRAVENOUS CONTINUOUS PRN
Status: DISCONTINUED | OUTPATIENT
Start: 2025-04-30 | End: 2025-04-30

## 2025-04-30 RX ADMIN — PROPOFOL 20 MG: 10 INJECTION, EMULSION INTRAVENOUS at 10:14

## 2025-04-30 RX ADMIN — FENTANYL CITRATE 50 MCG: 50 INJECTION INTRAMUSCULAR; INTRAVENOUS at 10:02

## 2025-04-30 RX ADMIN — LIDOCAINE HYDROCHLORIDE 3 ML: 20 INJECTION, SOLUTION INFILTRATION; PERINEURAL at 10:02

## 2025-04-30 RX ADMIN — FENTANYL CITRATE 25 MCG: 50 INJECTION INTRAMUSCULAR; INTRAVENOUS at 10:05

## 2025-04-30 RX ADMIN — PROPOFOL 100 MG: 10 INJECTION, EMULSION INTRAVENOUS at 10:02

## 2025-04-30 RX ADMIN — PROPOFOL 30 MG: 10 INJECTION, EMULSION INTRAVENOUS at 10:08

## 2025-04-30 RX ADMIN — FENTANYL CITRATE 25 MCG: 50 INJECTION INTRAMUSCULAR; INTRAVENOUS at 10:08

## 2025-04-30 RX ADMIN — SODIUM CHLORIDE: 9 INJECTION, SOLUTION INTRAVENOUS at 09:55

## 2025-04-30 RX ADMIN — PROPOFOL 50 MG: 10 INJECTION, EMULSION INTRAVENOUS at 10:05

## 2025-04-30 SDOH — HEALTH STABILITY: MENTAL HEALTH: CURRENT SMOKER: 0

## 2025-04-30 ASSESSMENT — PAIN - FUNCTIONAL ASSESSMENT
PAIN_FUNCTIONAL_ASSESSMENT: 0-10

## 2025-04-30 ASSESSMENT — PAIN SCALES - GENERAL
PAINLEVEL_OUTOF10: 0 - NO PAIN
PAINLEVEL_OUTOF10: 0 - NO PAIN
PAIN_LEVEL: 0
PAINLEVEL_OUTOF10: 0 - NO PAIN
PAINLEVEL_OUTOF10: 0 - NO PAIN

## 2025-04-30 ASSESSMENT — COLUMBIA-SUICIDE SEVERITY RATING SCALE - C-SSRS
2. HAVE YOU ACTUALLY HAD ANY THOUGHTS OF KILLING YOURSELF?: NO
6. HAVE YOU EVER DONE ANYTHING, STARTED TO DO ANYTHING, OR PREPARED TO DO ANYTHING TO END YOUR LIFE?: NO
1. IN THE PAST MONTH, HAVE YOU WISHED YOU WERE DEAD OR WISHED YOU COULD GO TO SLEEP AND NOT WAKE UP?: NO

## 2025-04-30 NOTE — ANESTHESIA POSTPROCEDURE EVALUATION
Patient: Heri Beasley    Procedure Summary       Date: 04/30/25 Room / Location: [facility]    Anesthesia Start: 0955 Anesthesia Stop: 1022    Procedure: COLONOSCOPY Diagnosis: Screening for malignant neoplasm of colon    Scheduled Providers: Bryan Drummond MD Responsible Provider: CARLENE Encinas    Anesthesia Type: MAC ASA Status: 3            Anesthesia Type: MAC    Vitals Value Taken Time   /72 04/30/25 10:42   Temp 36.1 °C (97 °F) 04/30/25 10:42   Pulse 60 04/30/25 10:42   Resp 16 04/30/25 10:42   SpO2 98 % 04/30/25 10:42       Anesthesia Post Evaluation    Patient location during evaluation: bedside  Patient participation: complete - patient participated  Level of consciousness: awake and alert  Pain score: 0  Pain management: adequate  Airway patency: patent  Cardiovascular status: stable  Respiratory status: acceptable  Hydration status: acceptable  Postoperative Nausea and Vomiting: none        There were no known notable events for this encounter.

## 2025-04-30 NOTE — ANESTHESIA PREPROCEDURE EVALUATION
Patient: Heri Beasley    Procedure Information       Anesthesia Start Date/Time: 04/30/25 0955    Scheduled providers: Bryan Drummond MD    Procedure: COLONOSCOPY    Location: [facility]            Relevant Problems   Anesthesia (within normal limits)      Cardiac   (+) Coronary arteriosclerosis   (+) Essential hypertension   (+) Mixed hyperlipidemia   (+) Stented coronary artery (1999)      Pulmonary   (+) Moderate chronic obstructive pulmonary disease (Multi)      Neuro (within normal limits)      GI (within normal limits)      /Renal (within normal limits)      Liver (within normal limits)      Endocrine   (+) Hypothyroidism   (+) Type 2 diabetes mellitus      Hematology (within normal limits)       Clinical information reviewed:   Tobacco  Allergies  Meds   Med Hx  Surg Hx   Fam Hx  Soc Hx        NPO Detail:  NPO/Void Status  Date of Last Liquid: 04/30/25  Time of Last Liquid: 0740  Date of Last Solid: 04/29/25  Time of Last Solid: 1100 (Ritz crackers)  Last Intake Type: GI prep         Physical Exam    Airway  Mallampati: III  TM distance: >3 FB  Neck ROM: full  Mouth opening: 3 or more finger widths     Cardiovascular - normal exam  Rhythm: regular  Rate: normal     Dental - normal exam    (+) upper dentures, lower dentures     Pulmonary - normal exam   Abdominal - normal exam           Anesthesia Plan    History of general anesthesia?: yes  History of complications of general anesthesia?: no    ASA 3     MAC     The patient is not a current smoker.    intravenous induction   Anesthetic plan and risks discussed with patient.       Will notify you with x ray results.  In the interim take OTC Aleve as needed for pain relief

## 2025-05-01 NOTE — ADDENDUM NOTE
Encounter addended by: Maia Neri RN on: 5/1/2025 11:08 AM   Actions taken: Contacts section saved, Flowsheet accepted

## 2025-05-13 LAB
LABORATORY COMMENT REPORT: NORMAL
PATH REPORT.FINAL DX SPEC: NORMAL
PATH REPORT.GROSS SPEC: NORMAL
PATH REPORT.RELEVANT HX SPEC: NORMAL
PATH REPORT.TOTAL CANCER: NORMAL
RESIDENT REVIEW: NORMAL

## 2025-05-28 ENCOUNTER — APPOINTMENT (OUTPATIENT)
Facility: CLINIC | Age: 75
End: 2025-05-28
Payer: MEDICARE

## 2025-10-08 ENCOUNTER — APPOINTMENT (OUTPATIENT)
Dept: PRIMARY CARE | Facility: CLINIC | Age: 75
End: 2025-10-08
Payer: MEDICARE